# Patient Record
Sex: FEMALE | Race: WHITE | Employment: UNEMPLOYED | URBAN - METROPOLITAN AREA
[De-identification: names, ages, dates, MRNs, and addresses within clinical notes are randomized per-mention and may not be internally consistent; named-entity substitution may affect disease eponyms.]

---

## 2020-08-16 ENCOUNTER — HOSPITAL ENCOUNTER (INPATIENT)
Age: 65
LOS: 5 days | Discharge: HOME HEALTH | DRG: 286 | End: 2020-08-21
Attending: EMERGENCY MEDICINE | Admitting: INTERNAL MEDICINE
Payer: MEDICARE

## 2020-08-16 ENCOUNTER — APPOINTMENT (OUTPATIENT)
Dept: CT IMAGING | Age: 65
DRG: 286 | End: 2020-08-16
Attending: EMERGENCY MEDICINE
Payer: MEDICARE

## 2020-08-16 ENCOUNTER — APPOINTMENT (OUTPATIENT)
Dept: GENERAL RADIOLOGY | Age: 65
DRG: 286 | End: 2020-08-16
Attending: EMERGENCY MEDICINE
Payer: MEDICARE

## 2020-08-16 DIAGNOSIS — I50.9 ACUTE CONGESTIVE HEART FAILURE, UNSPECIFIED HEART FAILURE TYPE (HCC): Primary | ICD-10-CM

## 2020-08-16 DIAGNOSIS — R77.8 ELEVATED TROPONIN: ICD-10-CM

## 2020-08-16 LAB
ALBUMIN SERPL-MCNC: 4 G/DL (ref 3.4–5)
ALBUMIN/GLOB SERPL: 1.3 {RATIO} (ref 0.8–1.7)
ALP SERPL-CCNC: 80 U/L (ref 45–117)
ALT SERPL-CCNC: 96 U/L (ref 13–56)
ANION GAP SERPL CALC-SCNC: 11 MMOL/L (ref 3–18)
APPEARANCE UR: ABNORMAL
AST SERPL-CCNC: 68 U/L (ref 10–38)
ATRIAL RATE: 126 BPM
BACTERIA URNS QL MICRO: NEGATIVE /HPF
BASOPHILS # BLD: 0.1 K/UL (ref 0–0.1)
BASOPHILS NFR BLD: 1 % (ref 0–2)
BILIRUB SERPL-MCNC: 1.5 MG/DL (ref 0.2–1)
BILIRUB UR QL: NEGATIVE
BNP SERPL-MCNC: 8935 PG/ML (ref 0–900)
BUN SERPL-MCNC: 15 MG/DL (ref 7–18)
BUN/CREAT SERPL: 21 (ref 12–20)
CALCIUM SERPL-MCNC: 8.3 MG/DL (ref 8.5–10.1)
CALCULATED P AXIS, ECG09: -2 DEGREES
CALCULATED R AXIS, ECG10: -49 DEGREES
CALCULATED T AXIS, ECG11: 82 DEGREES
CHLORIDE SERPL-SCNC: 109 MMOL/L (ref 100–111)
CO2 SERPL-SCNC: 20 MMOL/L (ref 21–32)
COLOR UR: ABNORMAL
CREAT SERPL-MCNC: 0.7 MG/DL (ref 0.6–1.3)
D DIMER PPP FEU-MCNC: 0.59 UG/ML(FEU)
DIAGNOSIS, 93000: NORMAL
DIFFERENTIAL METHOD BLD: ABNORMAL
EOSINOPHIL # BLD: 0 K/UL (ref 0–0.4)
EOSINOPHIL NFR BLD: 0 % (ref 0–5)
EPITH CASTS URNS QL MICRO: NORMAL /LPF (ref 0–5)
ERYTHROCYTE [DISTWIDTH] IN BLOOD BY AUTOMATED COUNT: 16 % (ref 11.6–14.5)
GLOBULIN SER CALC-MCNC: 3 G/DL (ref 2–4)
GLUCOSE SERPL-MCNC: 136 MG/DL (ref 74–99)
GLUCOSE UR STRIP.AUTO-MCNC: NEGATIVE MG/DL
HCT VFR BLD AUTO: 46.5 % (ref 35–45)
HGB BLD-MCNC: 15.2 G/DL (ref 12–16)
HGB UR QL STRIP: NEGATIVE
KETONES UR QL STRIP.AUTO: 15 MG/DL
LEUKOCYTE ESTERASE UR QL STRIP.AUTO: ABNORMAL
LYMPHOCYTES # BLD: 1 K/UL (ref 0.9–3.6)
LYMPHOCYTES NFR BLD: 11 % (ref 21–52)
MCH RBC QN AUTO: 27.4 PG (ref 24–34)
MCHC RBC AUTO-ENTMCNC: 32.7 G/DL (ref 31–37)
MCV RBC AUTO: 83.9 FL (ref 74–97)
MONOCYTES # BLD: 0.7 K/UL (ref 0.05–1.2)
MONOCYTES NFR BLD: 8 % (ref 3–10)
NEUTS SEG # BLD: 7.1 K/UL (ref 1.8–8)
NEUTS SEG NFR BLD: 80 % (ref 40–73)
NITRITE UR QL STRIP.AUTO: NEGATIVE
P-R INTERVAL, ECG05: 130 MS
PH UR STRIP: 5 [PH] (ref 5–8)
PLATELET # BLD AUTO: 204 K/UL (ref 135–420)
PMV BLD AUTO: 10.3 FL (ref 9.2–11.8)
POTASSIUM SERPL-SCNC: 3.7 MMOL/L (ref 3.5–5.5)
PROT SERPL-MCNC: 7 G/DL (ref 6.4–8.2)
PROT UR STRIP-MCNC: 100 MG/DL
Q-T INTERVAL, ECG07: 356 MS
QRS DURATION, ECG06: 148 MS
QTC CALCULATION (BEZET), ECG08: 515 MS
RBC # BLD AUTO: 5.54 M/UL (ref 4.2–5.3)
RBC #/AREA URNS HPF: NEGATIVE /HPF (ref 0–5)
SODIUM SERPL-SCNC: 140 MMOL/L (ref 136–145)
SP GR UR REFRACTOMETRY: 1.02 (ref 1–1.03)
TROPONIN I SERPL-MCNC: 0.12 NG/ML (ref 0–0.04)
TROPONIN I SERPL-MCNC: 0.14 NG/ML (ref 0–0.04)
TSH SERPL DL<=0.05 MIU/L-ACNC: 2.5 UIU/ML (ref 0.36–3.74)
UROBILINOGEN UR QL STRIP.AUTO: 1 EU/DL (ref 0.2–1)
VENTRICULAR RATE, ECG03: 126 BPM
WBC # BLD AUTO: 8.8 K/UL (ref 4.6–13.2)
WBC URNS QL MICRO: NORMAL /HPF (ref 0–4)

## 2020-08-16 PROCEDURE — 74011636320 HC RX REV CODE- 636/320: Performed by: EMERGENCY MEDICINE

## 2020-08-16 PROCEDURE — 85025 COMPLETE CBC W/AUTO DIFF WBC: CPT

## 2020-08-16 PROCEDURE — 81001 URINALYSIS AUTO W/SCOPE: CPT

## 2020-08-16 PROCEDURE — 99285 EMERGENCY DEPT VISIT HI MDM: CPT

## 2020-08-16 PROCEDURE — 93005 ELECTROCARDIOGRAM TRACING: CPT

## 2020-08-16 PROCEDURE — 71046 X-RAY EXAM CHEST 2 VIEWS: CPT

## 2020-08-16 PROCEDURE — 74011250637 HC RX REV CODE- 250/637: Performed by: EMERGENCY MEDICINE

## 2020-08-16 PROCEDURE — 83880 ASSAY OF NATRIURETIC PEPTIDE: CPT

## 2020-08-16 PROCEDURE — 71275 CT ANGIOGRAPHY CHEST: CPT

## 2020-08-16 PROCEDURE — 80053 COMPREHEN METABOLIC PANEL: CPT

## 2020-08-16 PROCEDURE — 85379 FIBRIN DEGRADATION QUANT: CPT

## 2020-08-16 PROCEDURE — 84443 ASSAY THYROID STIM HORMONE: CPT

## 2020-08-16 PROCEDURE — 74011250636 HC RX REV CODE- 250/636: Performed by: EMERGENCY MEDICINE

## 2020-08-16 PROCEDURE — 87635 SARS-COV-2 COVID-19 AMP PRB: CPT

## 2020-08-16 PROCEDURE — 65660000000 HC RM CCU STEPDOWN

## 2020-08-16 PROCEDURE — 84484 ASSAY OF TROPONIN QUANT: CPT

## 2020-08-16 PROCEDURE — 96374 THER/PROPH/DIAG INJ IV PUSH: CPT

## 2020-08-16 RX ORDER — ASPIRIN 325 MG
325 TABLET ORAL
Status: COMPLETED | OUTPATIENT
Start: 2020-08-16 | End: 2020-08-16

## 2020-08-16 RX ORDER — ESTRADIOL 0.05 MG/D
FILM, EXTENDED RELEASE TRANSDERMAL
COMMUNITY
Start: 2020-07-13

## 2020-08-16 RX ORDER — PROGESTERONE 200 MG/1
300 CAPSULE ORAL DAILY
COMMUNITY
Start: 2020-07-07

## 2020-08-16 RX ORDER — LEVOTHYROXINE, LIOTHYRONINE 38; 9 UG/1; UG/1
TABLET ORAL
COMMUNITY
Start: 2020-07-13

## 2020-08-16 RX ORDER — BUPROPION HYDROCHLORIDE 150 MG/1
TABLET ORAL
COMMUNITY
Start: 2020-08-02 | End: 2021-10-19 | Stop reason: ALTCHOICE

## 2020-08-16 RX ORDER — TRAZODONE HYDROCHLORIDE 50 MG/1
TABLET ORAL
COMMUNITY
Start: 2020-06-29

## 2020-08-16 RX ORDER — FUROSEMIDE 10 MG/ML
40 INJECTION INTRAMUSCULAR; INTRAVENOUS
Status: COMPLETED | OUTPATIENT
Start: 2020-08-16 | End: 2020-08-16

## 2020-08-16 RX ADMIN — ASPIRIN 325 MG ORAL TABLET 325 MG: 325 PILL ORAL at 15:32

## 2020-08-16 RX ADMIN — FUROSEMIDE 40 MG: 10 INJECTION, SOLUTION INTRAMUSCULAR; INTRAVENOUS at 13:06

## 2020-08-16 RX ADMIN — IOPAMIDOL 80 ML: 755 INJECTION, SOLUTION INTRAVENOUS at 12:45

## 2020-08-16 NOTE — Clinical Note
TRANSFER - OUT REPORT:     Verbal report given to: Reji Armendariz RN. Report consisted of patient's Situation, Background, Assessment and   Recommendations(SBAR). Opportunity for questions and clarification was provided. Patient transported with a Cardiac Cath Tech / Patient Care Tech. Patient transported to: [facility].

## 2020-08-16 NOTE — Clinical Note
TRANSFER - IN REPORT:     Verbal report received from: Katarina Mabry RN. Report consisted of patient's Situation, Background, Assessment and   Recommendations(SBAR). Opportunity for questions and clarification was provided. Assessment completed upon patient's arrival to unit and care assumed. Patient transported with a Cardiac Cath Tech / Patient Care Tech.

## 2020-08-16 NOTE — Clinical Note
Right groin and right radial clipped prepped with ChloraPrep and draped. Wet prep elapsed drying time: 3 mins.

## 2020-08-16 NOTE — ED TRIAGE NOTES
Pt reports rapid heart rate, palpitations and fatigue x 3 days. Pt states increased today while walking her dog. Pt denies any chest pain.

## 2020-08-16 NOTE — ED PROVIDER NOTES
59-year-old female history of hypothyroidism, anxiety presents for evaluation of shortness of breath and rapid heart rate. Patient believes she passed a kidney stone 3 days ago. She had right flank pain with nausea. Transient difficulty voiding. Symptoms have all resolved at this point. Starting yesterday however, patient notes marked dyspnea. When she attempted to walk her dog she became very short of breath. This morning symptoms were worse with increasing dyspnea on exertion and subjective tachycardia. Denies chest pain. No known exposure to [employer]. Denies productive cough or fever. No known history of COPD, CHF, or PE/DVT. Patient is a non-smoker. Admits that she has not seen a regular doctor for years but does receive medications through an alternative and complementary medicine practice. No past medical history on file. No past surgical history on file. No family history on file.     Social History     Socioeconomic History    Marital status: SINGLE     Spouse name: Not on file    Number of children: Not on file    Years of education: Not on file    Highest education level: Not on file   Occupational History    Not on file   Social Needs    Financial resource strain: Not on file    Food insecurity     Worry: Not on file     Inability: Not on file    Transportation needs     Medical: Not on file     Non-medical: Not on file   Tobacco Use    Smoking status: Not on file   Substance and Sexual Activity    Alcohol use: Not on file    Drug use: Not on file    Sexual activity: Not on file   Lifestyle    Physical activity     Days per week: Not on file     Minutes per session: Not on file    Stress: Not on file   Relationships    Social connections     Talks on phone: Not on file     Gets together: Not on file     Attends Orthodox service: Not on file     Active member of club or organization: Not on file     Attends meetings of clubs or organizations: Not on file Relationship status: Not on file    Intimate partner violence     Fear of current or ex partner: Not on file     Emotionally abused: Not on file     Physically abused: Not on file     Forced sexual activity: Not on file   Other Topics Concern    Not on file   Social History Narrative    Not on file         ALLERGIES: Patient has no allergy information on record. Review of Systems   Constitutional: Negative for fever. Respiratory: Positive for cough and shortness of breath. Cardiovascular: Negative for chest pain and leg swelling. Gastrointestinal: Negative for abdominal pain. Genitourinary: Positive for flank pain. All other systems reviewed and are negative. Vitals:    08/16/20 1021   BP: (!) 145/96   Pulse: (!) 117   Resp: 24   Temp: 97.6 °F (36.4 °C)   SpO2: 93%   Weight: 108.9 kg (240 lb)   Height: 5' 2\" (1.575 m)            Physical Exam  Vitals signs and nursing note reviewed. Constitutional:       General: She is not in acute distress. Appearance: She is well-developed. She is not diaphoretic. HENT:      Head: Normocephalic and atraumatic. Nose: Nose normal.   Eyes:      General: No scleral icterus. Right eye: No discharge. Left eye: No discharge. Neck:      Musculoskeletal: Neck supple. Vascular: No JVD. Cardiovascular:      Rate and Rhythm: Regular rhythm. Tachycardia present. Heart sounds: Normal heart sounds. No murmur. No friction rub. No gallop. Pulmonary:      Effort: Pulmonary effort is normal.      Breath sounds: Normal breath sounds. No wheezing or rales. Comments: Tachypnea at rest.  Abdominal:      Palpations: Abdomen is soft. Tenderness: There is no abdominal tenderness. There is no guarding or rebound. Musculoskeletal:         General: No tenderness. Right lower leg: No edema. Left lower leg: No edema. Skin:     General: Skin is warm and dry. Findings: No rash.    Neurological:      Mental Status: She is alert and oriented to person, place, and time. Motor: No abnormal muscle tone. Coordination: Coordination normal.   Psychiatric:         Mood and Affect: Mood normal.     EKG interpreted per myself at 1012 hrs. Sinus tachycardia, rate 126. Left bundle branch block pattern with left axis deviation pattern. No old EKGs for comparison. Recent Results (from the past 12 hour(s))   EKG, 12 LEAD, INITIAL    Collection Time: 08/16/20 10:11 AM   Result Value Ref Range    Ventricular Rate 126 BPM    Atrial Rate 126 BPM    P-R Interval 130 ms    QRS Duration 148 ms    Q-T Interval 356 ms    QTC Calculation (Bezet) 515 ms    Calculated P Axis -2 degrees    Calculated R Axis -49 degrees    Calculated T Axis 82 degrees    Diagnosis       Sinus tachycardia with premature atrial complexes  Left axis deviation  Left bundle branch block  Abnormal ECG  No previous ECGs available     CBC WITH AUTOMATED DIFF    Collection Time: 08/16/20 10:35 AM   Result Value Ref Range    WBC 8.8 4.6 - 13.2 K/uL    RBC 5.54 (H) 4.20 - 5.30 M/uL    HGB 15.2 12.0 - 16.0 g/dL    HCT 46.5 (H) 35.0 - 45.0 %    MCV 83.9 74.0 - 97.0 FL    MCH 27.4 24.0 - 34.0 PG    MCHC 32.7 31.0 - 37.0 g/dL    RDW 16.0 (H) 11.6 - 14.5 %    PLATELET 661 878 - 162 K/uL    MPV 10.3 9.2 - 11.8 FL    NEUTROPHILS 80 (H) 40 - 73 %    LYMPHOCYTES 11 (L) 21 - 52 %    MONOCYTES 8 3 - 10 %    EOSINOPHILS 0 0 - 5 %    BASOPHILS 1 0 - 2 %    ABS. NEUTROPHILS 7.1 1.8 - 8.0 K/UL    ABS. LYMPHOCYTES 1.0 0.9 - 3.6 K/UL    ABS. MONOCYTES 0.7 0.05 - 1.2 K/UL    ABS. EOSINOPHILS 0.0 0.0 - 0.4 K/UL    ABS.  BASOPHILS 0.1 0.0 - 0.1 K/UL    DF AUTOMATED     METABOLIC PANEL, COMPREHENSIVE    Collection Time: 08/16/20 10:35 AM   Result Value Ref Range    Sodium 140 136 - 145 mmol/L    Potassium 3.7 3.5 - 5.5 mmol/L    Chloride 109 100 - 111 mmol/L    CO2 20 (L) 21 - 32 mmol/L    Anion gap 11 3.0 - 18 mmol/L    Glucose 136 (H) 74 - 99 mg/dL    BUN 15 7.0 - 18 MG/DL    Creatinine 0. 70 0.6 - 1.3 MG/DL    BUN/Creatinine ratio 21 (H) 12 - 20      GFR est AA >60 >60 ml/min/1.73m2    GFR est non-AA >60 >60 ml/min/1.73m2    Calcium 8.3 (L) 8.5 - 10.1 MG/DL    Bilirubin, total 1.5 (H) 0.2 - 1.0 MG/DL    ALT (SGPT) 96 (H) 13 - 56 U/L    AST (SGOT) 68 (H) 10 - 38 U/L    Alk. phosphatase 80 45 - 117 U/L    Protein, total 7.0 6.4 - 8.2 g/dL    Albumin 4.0 3.4 - 5.0 g/dL    Globulin 3.0 2.0 - 4.0 g/dL    A-G Ratio 1.3 0.8 - 1.7     NT-PRO BNP    Collection Time: 08/16/20 10:35 AM   Result Value Ref Range    NT pro-BNP 8,935 (H) 0 - 900 PG/ML   D DIMER    Collection Time: 08/16/20 10:35 AM   Result Value Ref Range    D DIMER 0.59 (H) <0.46 ug/ml(FEU)   TROPONIN I    Collection Time: 08/16/20 10:35 AM   Result Value Ref Range    Troponin-I, QT 0.12 (H) 0.0 - 0.045 NG/ML   URINALYSIS W/ RFLX MICROSCOPIC    Collection Time: 08/16/20 10:50 AM   Result Value Ref Range    Color DARK YELLOW      Appearance CLOUDY      Specific gravity 1.024 1.005 - 1.030      pH (UA) 5.0 5.0 - 8.0      Protein 100 (A) NEG mg/dL    Glucose Negative NEG mg/dL    Ketone 15 (A) NEG mg/dL    Bilirubin Negative NEG      Blood Negative NEG      Urobilinogen 1.0 0.2 - 1.0 EU/dL    Nitrites Negative NEG      Leukocyte Esterase TRACE (A) NEG     URINE MICROSCOPIC ONLY    Collection Time: 08/16/20 10:50 AM   Result Value Ref Range    WBC 0 to 3 0 - 4 /hpf    RBC Negative 0 - 5 /hpf    Epithelial cells 1+ 0 - 5 /lpf    Bacteria Negative NEG /hpf     CTA CHEST W OR W WO CONT   Final Result   IMPRESSION:      1. No evidence for pulmonary embolism. .   2.  Mild diffuse interstitial prominence, likely edema. 3.  Patchy subtle groundglass opacities throughout the lungs bilaterally which   are nonspecific but could be due to edema or infectious or inflammatory process. Findings could be seen in covid pneumonia although are not typical appearance. Constellation of findings favors CHF. 4.  Small right and trace left pleural effusion.    5. Large hiatal hernia         XR CHEST PA LAT   Final Result   IMPRESSION:     1. Mild cardiomegaly. Central pulmonary vascular congestion. Diffuse   interstitial prominence suggesting edema. 2.  Large hiatal hernia                    MDM  Number of Diagnoses or Management Options  Acute congestive heart failure, unspecified heart failure type [facility]):   Diagnosis management comments: Impression: Dyspnea and tachycardia. Worrisome presentation for PE. Also consider pneumonia, new onset CHF, COVID-19 pulmonary infection, atypical ACS. Less likely sepsis given absence of fever. No history of cough. Evaluation to include labs including troponin and d-dimer, EKG, chest x-ray and consideration for CTA of the chest based on results of d-dimer test.      Results of BNP and chest x-ray consistent with acute congestive heart failure. Patient also demonstrates elevated troponin which will need to be serially monitored. EKG with left bundle branch block uncertain duration. No chest pain clinically. Patient diuresed well with 60 mg of IV Lasix producing roughly 1 L of urine. Felt him proved with this intervention. Heart rate declined to approximately 100 sinus tachycardia. Case discussed with Dr. Davies, hospitalist, for admission to telemetry. Usual and customary discussion of patient's history and ED course. Care discussed with cardiology, Dr. Maverick Iqbal. No indication for initiation of heparin, ACE inhibitors, or beta blockers at this time. Procedures      Diagnosis:   1.  Acute congestive heart failure, unspecified heart failure type ([facility] [address]          Disposition: admit    Follow-up Information    None         Patient's Medications   Start Taking    No medications on file   Continue Taking    BUPROPION XL (WELLBUTRIN XL) 150 MG TABLET        ESTRADIOL (VIVELLE) 0.05 MG/24 HR    APPLY 1 PATCH ON THE SKIN TWICE A WEEK    NP THYROID 60 MG TABLET    TAKE 1 TABLET BY MOUTH ONCE DAILY IN THE MORNING PROGESTERONE (PROMETRIUM) 200 MG CAPSULE    TAKE 1 CAPSULE BY MOUTH AT BEDTIME    TRAZODONE (DESYREL) 50 MG TABLET    TAKE 1 TABLET BY MOUTH ONCE DAILY AT BEDTIME   These Medications have changed    No medications on file   Stop Taking    No medications on file

## 2020-08-17 ENCOUNTER — APPOINTMENT (OUTPATIENT)
Dept: GENERAL RADIOLOGY | Age: 65
DRG: 286 | End: 2020-08-17
Attending: EMERGENCY MEDICINE
Payer: MEDICARE

## 2020-08-17 LAB
ATRIAL RATE: 90 BPM
CALCULATED P AXIS, ECG09: 51 DEGREES
CALCULATED R AXIS, ECG10: -62 DEGREES
CALCULATED T AXIS, ECG11: 65 DEGREES
DIAGNOSIS, 93000: NORMAL
P-R INTERVAL, ECG05: 152 MS
Q-T INTERVAL, ECG07: 420 MS
QRS DURATION, ECG06: 156 MS
QTC CALCULATION (BEZET), ECG08: 513 MS
TROPONIN I SERPL-MCNC: 0.12 NG/ML (ref 0–0.04)
TROPONIN I SERPL-MCNC: 0.17 NG/ML (ref 0–0.04)
VENTRICULAR RATE, ECG03: 90 BPM

## 2020-08-17 PROCEDURE — 74011000250 HC RX REV CODE- 250: Performed by: EMERGENCY MEDICINE

## 2020-08-17 PROCEDURE — 65660000000 HC RM CCU STEPDOWN

## 2020-08-17 PROCEDURE — 93005 ELECTROCARDIOGRAM TRACING: CPT

## 2020-08-17 PROCEDURE — 71045 X-RAY EXAM CHEST 1 VIEW: CPT

## 2020-08-17 PROCEDURE — 84484 ASSAY OF TROPONIN QUANT: CPT

## 2020-08-17 PROCEDURE — 74011250636 HC RX REV CODE- 250/636: Performed by: EMERGENCY MEDICINE

## 2020-08-17 RX ADMIN — AZITHROMYCIN MONOHYDRATE 500 MG: 500 INJECTION, POWDER, LYOPHILIZED, FOR SOLUTION INTRAVENOUS at 06:32

## 2020-08-17 RX ADMIN — CEFTRIAXONE SODIUM 1 G: 1 INJECTION, POWDER, FOR SOLUTION INTRAMUSCULAR; INTRAVENOUS at 06:32

## 2020-08-17 NOTE — ED NOTES
Awake in bed, watching TV. Denies any discomforts.
Awake talking on phone. Denies any discomforts. Purposeful rounding completed:  Side rails up x 2:  YES  Bed in low position and wheels locked: YES  Call bell within reach: YES  Comfort addressed: YES    Toileting needs addressed: YES  Plan of care reviewed/updated with patient and or family members: YES  IV site assessed: YES  Pain assessed and addressed: YES  Will continue to monitor.
Bedside Hand-off-Report given to oncoming shift RN Javier Salinas), including  all care previously given. Addressed all questions asked by oncoming RN.
Bedside and Verbal shift change report given to Patricia Landeros RN (oncoming nurse) by Fred Sweeney RN (offgoing nurse). Report included the following information SBAR and MAR.
Breakfast tray to bedside
Dinner tray to bedside.
Lunch tray to bedside
Patient denies pain at this time. Has been up to [facility] without assistance x2-3 times. States she is feeling much better. Updated on status, position of comfort.
Patient resting quietly on stretcher in low and locked position. Call bell in reach. Patient states she continues to improve. Denies chest pain or shortness of breath at this time. Dinner at bedside.
Pt. Reassessed at this time and has no complaints, pt asked about bed status and was updated that no beds were avalible at this time. Pt also asking for diet pepsi and was given some, pt eating her dinner without complications and tolerating well. Pt sitting in bed in the POC with NAD at this time. MD notified and will continue to monitor.
Purposeful rounding completed:  Side rails up x 2:  YES  Bed in low position and wheels locked: YES  Call bell within reach: YES  Comfort addressed: YES    Toileting needs addressed: YES  Plan of care reviewed/updated with patient and or family members: YES  IV site assessed: YES  Pain assessed and addressed: YES  Will continue to monitor.
Received report from off-going-shift RN (Winnie),and assumed care of patient. Call bell with safia.
Report given to SARWAT Mason RN to include ED Summary.
Resting quietly. No distress noted. Respirations equal and unlabored. Skins warm, dry, acyanotic. Arouses easily to verbal stimuli. Body indicators negative for pain or discomfort. Arouses with no complaints. Will continue to monitor.
Up to bathroom via wheelchair and staff assist. Dyspnea upon exertion. Back to stretcher.  Sa02 96%, 's
No

## 2020-08-18 ENCOUNTER — APPOINTMENT (OUTPATIENT)
Dept: NON INVASIVE DIAGNOSTICS | Age: 65
DRG: 286 | End: 2020-08-18
Attending: INTERNAL MEDICINE
Payer: MEDICARE

## 2020-08-18 PROBLEM — R77.8 ELEVATED TROPONIN: Status: ACTIVE | Noted: 2020-08-18

## 2020-08-18 PROBLEM — I42.9 CARDIOMYOPATHY (HCC): Status: ACTIVE | Noted: 2020-08-18

## 2020-08-18 PROBLEM — J18.9 CAP (COMMUNITY ACQUIRED PNEUMONIA): Status: ACTIVE | Noted: 2020-08-18

## 2020-08-18 PROBLEM — Z20.822 SUSPECTED COVID-19 VIRUS INFECTION: Status: ACTIVE | Noted: 2020-08-18

## 2020-08-18 LAB
ALBUMIN SERPL-MCNC: 3.4 G/DL (ref 3.4–5)
ALBUMIN/GLOB SERPL: 1.1 {RATIO} (ref 0.8–1.7)
ALP SERPL-CCNC: 63 U/L (ref 45–117)
ALT SERPL-CCNC: 78 U/L (ref 13–56)
ANION GAP SERPL CALC-SCNC: 7 MMOL/L (ref 3–18)
AST SERPL-CCNC: 40 U/L (ref 10–38)
ATRIAL RATE: 104 BPM
BASOPHILS # BLD: 0.1 K/UL (ref 0–0.1)
BASOPHILS NFR BLD: 1 % (ref 0–2)
BILIRUB SERPL-MCNC: 1 MG/DL (ref 0.2–1)
BNP SERPL-MCNC: 6755 PG/ML (ref 0–900)
BUN SERPL-MCNC: 15 MG/DL (ref 7–18)
BUN/CREAT SERPL: 24 (ref 12–20)
CALCIUM SERPL-MCNC: 8.5 MG/DL (ref 8.5–10.1)
CALCULATED P AXIS, ECG09: 37 DEGREES
CALCULATED R AXIS, ECG10: -24 DEGREES
CALCULATED T AXIS, ECG11: 41 DEGREES
CHLORIDE SERPL-SCNC: 111 MMOL/L (ref 100–111)
CHOLEST SERPL-MCNC: 142 MG/DL
CK MB CFR SERPL CALC: 3.1 % (ref 0–4)
CK MB SERPL-MCNC: 2.3 NG/ML (ref 5–25)
CK SERPL-CCNC: 75 U/L (ref 26–192)
CO2 SERPL-SCNC: 25 MMOL/L (ref 21–32)
CREAT SERPL-MCNC: 0.63 MG/DL (ref 0.6–1.3)
CRP SERPL-MCNC: 1 MG/DL (ref 0–0.3)
D DIMER PPP FEU-MCNC: 0.44 UG/ML(FEU)
DIAGNOSIS, 93000: NORMAL
DIFFERENTIAL METHOD BLD: ABNORMAL
ECHO LV INTERNAL DIMENSION DIASTOLIC: 6.19 CM (ref 3.9–5.3)
ECHO LV INTERNAL DIMENSION SYSTOLIC: 5.1 CM
ECHO LV IVSD: 0.51 CM (ref 0.6–0.9)
ECHO LV MASS 2D: 205.7 G (ref 67–162)
ECHO LV MASS INDEX 2D: 99.6 G/M2 (ref 43–95)
ECHO LV POSTERIOR WALL DIASTOLIC: 1.15 CM (ref 0.6–0.9)
ECHO RV TAPSE: 3.1 CM (ref 1.5–2)
ECHO TV REGURGITANT MAX VELOCITY: 287.51 CM/S
ECHO TV REGURGITANT PEAK GRADIENT: 33.07 MMHG
EOSINOPHIL # BLD: 0.1 K/UL (ref 0–0.4)
EOSINOPHIL NFR BLD: 2 % (ref 0–5)
ERYTHROCYTE [DISTWIDTH] IN BLOOD BY AUTOMATED COUNT: 16 % (ref 11.6–14.5)
EST. AVERAGE GLUCOSE BLD GHB EST-MCNC: 103 MG/DL
FERRITIN SERPL-MCNC: 26 NG/ML (ref 8–388)
GLOBULIN SER CALC-MCNC: 3.1 G/DL (ref 2–4)
GLUCOSE SERPL-MCNC: 105 MG/DL (ref 74–99)
HBA1C MFR BLD: 5.2 % (ref 4.2–5.6)
HCT VFR BLD AUTO: 41.4 % (ref 35–45)
HDLC SERPL-MCNC: 52 MG/DL (ref 40–60)
HDLC SERPL: 2.7 {RATIO} (ref 0–5)
HGB BLD-MCNC: 13.6 G/DL (ref 12–16)
L PNEUMO AG UR QL IA: NEGATIVE
LACTATE SERPL-SCNC: 0.9 MMOL/L (ref 0.4–2)
LDH SERPL L TO P-CCNC: 187 U/L (ref 81–234)
LDLC SERPL CALC-MCNC: 75 MG/DL (ref 0–100)
LIPID PROFILE,FLP: NORMAL
LYMPHOCYTES # BLD: 1.2 K/UL (ref 0.9–3.6)
LYMPHOCYTES NFR BLD: 23 % (ref 21–52)
MAGNESIUM SERPL-MCNC: 2 MG/DL (ref 1.6–2.6)
MCH RBC QN AUTO: 27.8 PG (ref 24–34)
MCHC RBC AUTO-ENTMCNC: 32.9 G/DL (ref 31–37)
MCV RBC AUTO: 84.5 FL (ref 74–97)
MONOCYTES # BLD: 0.5 K/UL (ref 0.05–1.2)
MONOCYTES NFR BLD: 10 % (ref 3–10)
NEUTS SEG # BLD: 3.3 K/UL (ref 1.8–8)
NEUTS SEG NFR BLD: 64 % (ref 40–73)
P-R INTERVAL, ECG05: 136 MS
PLATELET # BLD AUTO: 177 K/UL (ref 135–420)
PMV BLD AUTO: 10.6 FL (ref 9.2–11.8)
POTASSIUM SERPL-SCNC: 3.6 MMOL/L (ref 3.5–5.5)
PROCALCITONIN SERPL-MCNC: <0.05 NG/ML
PROT SERPL-MCNC: 6.5 G/DL (ref 6.4–8.2)
Q-T INTERVAL, ECG07: 398 MS
QRS DURATION, ECG06: 154 MS
QTC CALCULATION (BEZET), ECG08: 523 MS
RBC # BLD AUTO: 4.9 M/UL (ref 4.2–5.3)
S PNEUM AG UR QL: NEGATIVE
SARS-COV-2, COV2NT: NOT DETECTED
SODIUM SERPL-SCNC: 143 MMOL/L (ref 136–145)
TRIGL SERPL-MCNC: 75 MG/DL (ref ?–150)
TROPONIN I SERPL-MCNC: 0.05 NG/ML (ref 0–0.04)
VENTRICULAR RATE, ECG03: 104 BPM
VLDLC SERPL CALC-MCNC: 15 MG/DL
WBC # BLD AUTO: 5.2 K/UL (ref 4.6–13.2)

## 2020-08-18 PROCEDURE — 74011000250 HC RX REV CODE- 250: Performed by: INTERNAL MEDICINE

## 2020-08-18 PROCEDURE — 97530 THERAPEUTIC ACTIVITIES: CPT

## 2020-08-18 PROCEDURE — 85025 COMPLETE CBC W/AUTO DIFF WBC: CPT

## 2020-08-18 PROCEDURE — 83880 ASSAY OF NATRIURETIC PEPTIDE: CPT

## 2020-08-18 PROCEDURE — 97162 PT EVAL MOD COMPLEX 30 MIN: CPT

## 2020-08-18 PROCEDURE — 85379 FIBRIN DEGRADATION QUANT: CPT

## 2020-08-18 PROCEDURE — 97535 SELF CARE MNGMENT TRAINING: CPT

## 2020-08-18 PROCEDURE — 80061 LIPID PANEL: CPT

## 2020-08-18 PROCEDURE — 97166 OT EVAL MOD COMPLEX 45 MIN: CPT

## 2020-08-18 PROCEDURE — 84145 PROCALCITONIN (PCT): CPT

## 2020-08-18 PROCEDURE — 93308 TTE F-UP OR LMTD: CPT

## 2020-08-18 PROCEDURE — 97116 GAIT TRAINING THERAPY: CPT

## 2020-08-18 PROCEDURE — 82550 ASSAY OF CK (CPK): CPT

## 2020-08-18 PROCEDURE — 87450 LEGIONELLA PNEUMOPHILA AG, URINE: CPT

## 2020-08-18 PROCEDURE — 74011250637 HC RX REV CODE- 250/637: Performed by: INTERNAL MEDICINE

## 2020-08-18 PROCEDURE — 87040 BLOOD CULTURE FOR BACTERIA: CPT

## 2020-08-18 PROCEDURE — 83615 LACTATE (LD) (LDH) ENZYME: CPT

## 2020-08-18 PROCEDURE — 74011250636 HC RX REV CODE- 250/636: Performed by: INTERNAL MEDICINE

## 2020-08-18 PROCEDURE — 87449 NOS EACH ORGANISM AG IA: CPT

## 2020-08-18 PROCEDURE — 86140 C-REACTIVE PROTEIN: CPT

## 2020-08-18 PROCEDURE — 80053 COMPREHEN METABOLIC PANEL: CPT

## 2020-08-18 PROCEDURE — 83605 ASSAY OF LACTIC ACID: CPT

## 2020-08-18 PROCEDURE — 36415 COLL VENOUS BLD VENIPUNCTURE: CPT

## 2020-08-18 PROCEDURE — 82728 ASSAY OF FERRITIN: CPT

## 2020-08-18 PROCEDURE — 93005 ELECTROCARDIOGRAM TRACING: CPT

## 2020-08-18 PROCEDURE — 83036 HEMOGLOBIN GLYCOSYLATED A1C: CPT

## 2020-08-18 PROCEDURE — 65660000000 HC RM CCU STEPDOWN

## 2020-08-18 PROCEDURE — 83735 ASSAY OF MAGNESIUM: CPT

## 2020-08-18 RX ORDER — LISINOPRIL 5 MG/1
2.5 TABLET ORAL DAILY
Status: DISCONTINUED | OUTPATIENT
Start: 2020-08-18 | End: 2020-08-22 | Stop reason: HOSPADM

## 2020-08-18 RX ORDER — ACETAMINOPHEN 650 MG/1
650 SUPPOSITORY RECTAL
Status: DISCONTINUED | OUTPATIENT
Start: 2020-08-18 | End: 2020-08-22 | Stop reason: HOSPADM

## 2020-08-18 RX ORDER — FUROSEMIDE 10 MG/ML
40 INJECTION INTRAMUSCULAR; INTRAVENOUS 2 TIMES DAILY
Status: DISCONTINUED | OUTPATIENT
Start: 2020-08-18 | End: 2020-08-18

## 2020-08-18 RX ORDER — FUROSEMIDE 10 MG/ML
40 INJECTION INTRAMUSCULAR; INTRAVENOUS DAILY
Status: DISCONTINUED | OUTPATIENT
Start: 2020-08-19 | End: 2020-08-19

## 2020-08-18 RX ORDER — ENOXAPARIN SODIUM 100 MG/ML
40 INJECTION SUBCUTANEOUS DAILY
Status: DISCONTINUED | OUTPATIENT
Start: 2020-08-18 | End: 2020-08-18

## 2020-08-18 RX ORDER — SODIUM CHLORIDE 0.9 % (FLUSH) 0.9 %
5-40 SYRINGE (ML) INJECTION EVERY 8 HOURS
Status: DISCONTINUED | OUTPATIENT
Start: 2020-08-18 | End: 2020-08-22 | Stop reason: HOSPADM

## 2020-08-18 RX ORDER — POLYETHYLENE GLYCOL 3350 17 G/17G
17 POWDER, FOR SOLUTION ORAL DAILY PRN
Status: DISCONTINUED | OUTPATIENT
Start: 2020-08-18 | End: 2020-08-22 | Stop reason: HOSPADM

## 2020-08-18 RX ORDER — CARVEDILOL 6.25 MG/1
3.12 TABLET ORAL 2 TIMES DAILY WITH MEALS
Status: DISCONTINUED | OUTPATIENT
Start: 2020-08-18 | End: 2020-08-22 | Stop reason: HOSPADM

## 2020-08-18 RX ORDER — PROMETHAZINE HYDROCHLORIDE 12.5 MG/1
12.5 TABLET ORAL
Status: DISCONTINUED | OUTPATIENT
Start: 2020-08-18 | End: 2020-08-22 | Stop reason: HOSPADM

## 2020-08-18 RX ORDER — FAMOTIDINE 20 MG/1
20 TABLET, FILM COATED ORAL 2 TIMES DAILY
Status: DISCONTINUED | OUTPATIENT
Start: 2020-08-18 | End: 2020-08-22 | Stop reason: HOSPADM

## 2020-08-18 RX ORDER — SODIUM CHLORIDE 0.9 % (FLUSH) 0.9 %
5-40 SYRINGE (ML) INJECTION AS NEEDED
Status: DISCONTINUED | OUTPATIENT
Start: 2020-08-18 | End: 2020-08-22 | Stop reason: HOSPADM

## 2020-08-18 RX ORDER — ASPIRIN 81 MG/1
81 TABLET ORAL DAILY
Status: DISCONTINUED | OUTPATIENT
Start: 2020-08-18 | End: 2020-08-22 | Stop reason: HOSPADM

## 2020-08-18 RX ORDER — ACETAMINOPHEN 325 MG/1
650 TABLET ORAL
Status: DISCONTINUED | OUTPATIENT
Start: 2020-08-18 | End: 2020-08-22 | Stop reason: HOSPADM

## 2020-08-18 RX ORDER — ONDANSETRON 2 MG/ML
4 INJECTION INTRAMUSCULAR; INTRAVENOUS
Status: DISCONTINUED | OUTPATIENT
Start: 2020-08-18 | End: 2020-08-22 | Stop reason: HOSPADM

## 2020-08-18 RX ORDER — ENOXAPARIN SODIUM 100 MG/ML
40 INJECTION SUBCUTANEOUS EVERY 24 HOURS
Status: DISCONTINUED | OUTPATIENT
Start: 2020-08-18 | End: 2020-08-22 | Stop reason: HOSPADM

## 2020-08-18 RX ADMIN — LISINOPRIL 2.5 MG: 5 TABLET ORAL at 17:47

## 2020-08-18 RX ADMIN — ENOXAPARIN SODIUM 40 MG: 40 INJECTION SUBCUTANEOUS at 12:41

## 2020-08-18 RX ADMIN — CARVEDILOL 3.12 MG: 6.25 TABLET, FILM COATED ORAL at 17:47

## 2020-08-18 RX ADMIN — ASPIRIN 81 MG: 81 TABLET, COATED ORAL at 12:42

## 2020-08-18 RX ADMIN — FAMOTIDINE 20 MG: 20 TABLET, FILM COATED ORAL at 12:42

## 2020-08-18 RX ADMIN — FUROSEMIDE 40 MG: 10 INJECTION, SOLUTION INTRAMUSCULAR; INTRAVENOUS at 12:42

## 2020-08-18 RX ADMIN — FAMOTIDINE 20 MG: 20 TABLET, FILM COATED ORAL at 17:47

## 2020-08-18 RX ADMIN — AZITHROMYCIN MONOHYDRATE 500 MG: 500 INJECTION, POWDER, LYOPHILIZED, FOR SOLUTION INTRAVENOUS at 12:42

## 2020-08-18 RX ADMIN — Medication 10 ML: at 22:25

## 2020-08-18 RX ADMIN — Medication 10 ML: at 09:00

## 2020-08-18 RX ADMIN — CEFTRIAXONE SODIUM 1 G: 1 INJECTION, POWDER, FOR SOLUTION INTRAMUSCULAR; INTRAVENOUS at 12:42

## 2020-08-18 RX ADMIN — Medication 10 ML: at 14:00

## 2020-08-18 NOTE — PROGRESS NOTES
Called pt's room and cell phone with no answer. Left a message on pt's cell phone asking for return call to complete initial assessment.        Shelbi  RN BSN  Care Manager  [phone]

## 2020-08-18 NOTE — PROGRESS NOTES
Problem: Self Care Deficits Care Plan (Adult)  Goal: *Acute Goals and Plan of Care (Insert Text)  Outcome: Resolved/Met       OCCUPATIONAL THERAPY EVALUATION/DISCHARGE    Patient: Nupur Alexander (77 y.o. female)  Date: 8/18/2020  Primary Diagnosis: [facility] [I50.9]  Precautions: (standard)  PLOF: Patient was independent with self-care and functional mobility PTA. ASSESSMENT AND RECOMMENDATIONS:  Patient cleared to participate in OT evaluation by RN. Upon entering the room, patient was seated EOB, alert, and agreeable to participate in OT evaluation with RN present but exiting room. Patient educated on the role of OT, evaluation process, and safety during this admission with patient verbalizing understanding. MD briefly present this session during functional mobility using no AD. The patient presents with good static standing and fair dynamic standing balance, however will defer to PT for functional balance and functional mobility tasks. Patient is independent - modified independent with basic self-care, modified independent with bed mobility and contact guard assistance with functional transfers. Patient educated on energy conservation techniques, pursed lip breathing, and self pacing during daily activities. Patient needed cues throughout session for pursed lip breathing as she was observed SOB but continuing to talk. HR ranged between  bpm with functional activity and 90-94 while seated EOB initially. Based on the objective data described below, the patient presents with no deficits that impede pt function with ADLs, functional transfers, and functional mobility. OT to d/c from caseload at this time. Skilled occupational therapy is not indicated at this time. Discharge Recommendations: Home Health with Supervision;  If pt with no family members able to provide supervision recommend Rehab for safety to increase endurance / balance for functional activities  Further Equipment Recommendations for Discharge: shower chair to decrease the risk of falls and conserve energy; Patient reported having grab bars. SUBJECTIVE:   Patient stated I think I over did it trying to clean out my [place] house    OBJECTIVE DATA SUMMARY:     Past Medical History:   Diagnosis Date    Hypothyroid     Obesity      Past Surgical History:   Procedure Laterality Date    HX OTHER SURGICAL  2016    neck lift      Barriers to Learning/Limitations: None  Compensate with: visual, verbal, tactile, kinesthetic cues/model    Home Situation:   Home Situation  Home Environment: Private residence  # Steps to Enter: 3(second home has 2 steps)  Rails to [employer]: Yes  One/Two Story Residence: One story  Living Alone: Yes  Support Systems: Friends \ neighbors  Patient Expects to be Discharged to[de-identified] Private residence  Current DME Used/Available at Home: Grab bars  Tub or Shower Type: Tub/Shower combination  [x]     Right hand dominant   []     Left hand dominant    Cognitive/Behavioral Status:  Neurologic State: Alert  Orientation Level: Oriented X4  Cognition: Follows commands  Safety/Judgement: Fall prevention;Decreased awareness of need for safety    Skin: Intact  Edema: None noted    Vision/Perceptual:    Acuity: Within Defined Limits      Coordination: BUE  Fine Motor Skills-Upper: Left Intact; Right Intact    Gross Motor Skills-Upper: Left Intact; Right Intact    Balance:  Sitting: Intact  Standing: Impaired; Without support  Standing - Static: Good  Standing - Dynamic : Fair(+)    Strength: BUE  Strength: Generally decreased, functional    Tone & Sensation: BUE  Tone: Normal  Sensation: Intact    Range of Motion: BUE  AROM: Within functional limits    Functional Mobility and Transfers for ADLs:  Bed Mobility:  Scooting: Modified independent    Transfers:  Sit to Stand: Contact guard assistance  Stand to Sit: Contact guard assistance   Toilet Transfer : Contact guard assistance(simulation)    ADL Assessment:  Feeding: Independent    Oral Facial Hygiene/Grooming: Independent    Bathing: Modified independent; Additional time    Upper Body Dressing: Independent    Lower Body Dressing: Modified independent; Additional time(additional time for LLE)    Toileting: Modified independent    ADL Intervention:  Grooming  Grooming Assistance: Independent  Position Performed: Seated edge of bed  Brushing/Combing Hair: Independent(simulation)    Upper Body Dressing Assistance  Dressing Assistance: Independent  Shirt simulation with hospital gown: Independent    Lower Body Dressing Assistance  Dressing Assistance: Modified independent  Socks: Modified independent(additional time needed for LLE 2/2 PMHx contusion)  Leg Crossed Method Used: Yes  Position Performed: Seated edge of bed    Cognitive Retraining  Safety/Judgement: Fall prevention;Decreased awareness of need for safety    Pain:  Pain level pre-treatment: 0/10   Pain level post-treatment: 0/10   Pain Intervention(s): Medication (see MAR); Response to intervention: Nurse notified, See doc flow    Activity Tolerance:   Good    Please refer to the flowsheet for vital signs taken during this treatment. After treatment:   []  Patient left in no apparent distress sitting up in chair  [x]  Patient left in no apparent distress in bed  [x]  Call bell left within reach  [x]  Nursing notified  []  Caregiver present  []  Bed alarm activated    COMMUNICATION/EDUCATION:   [x]      Role of Occupational Therapy in the acute care setting  [x]      Home safety education was provided and the patient/caregiver indicated understanding. [x]      Patient/family have participated as able and agree with findings and recommendations. []      Patient is unable to participate in plan of care at this time.     Thank you for this referral.  Brandy Mcclellan OTR/L  Time Calculation: 38 mins      Eval Complexity: History: MEDIUM Complexity : Expanded review of history including physical, cognitive and psychosocial  history ; Examination: LOW Complexity : 1-3 performance deficits relating to physical, cognitive , or psychosocial skils that result in activity limitations and / or participation restrictions ;    Decision Making:LOW Complexity : No comorbidities that affect functional and no verbal or physical assistance needed to complete eval tasks

## 2020-08-18 NOTE — ROUTINE PROCESS
Bedside and verbal report received from [address] (offgoing nurse). Report included the following information; SBAR, MAR, LABS, Intake/output, Kardex, and summary of care.

## 2020-08-18 NOTE — CONSULTS
[facility] - Consult Note    Date of  Admission: 8/16/2020 10:05 AM     Primary Care Physician:  None     Plan:     1. Acute congestive heart failure- decompensated. SOB improving with diuresis elevated NT pro BNP  No significant pheripheral edema-reduced lasix 40 mg to once a day as patient w/o significant BLE edema    2. Cardiomyopathy with EF <15 % - unclear etiology. Needs Cardiac w/u after diuresis and when COV-19 is r/o. Discussed with patient    3. Hypertension- labile continue with Coreg and ACEi. Would consider Entresto after diuresis. 4. Suspected COV-19   5. SOB- in the setting #1 and #6  Better   6. Morbid obesity- weight los strongly advised   7. LBBB- unknown onset  8. MR- mild to moderate on Recent Echo     Patient presenting with acute CHF and found to have severely depressed ejection fraction. Significant improvement in symptoms of abdominal tightness and shortness of breath with IV Lasix. Since there is not much fluid overload clinically, reduce IV Lasix to once a day. Add low-dose Coreg and ACE inhibitor. If tolerated, will add Aldactone tomorrow. Entresto once diuresis is complete and stable. COVID test is still pending and I wonder if it is secondary to COVID-19 myocarditis or other viral myocarditis. 08/16/20   ECHO ADULT FOLLOW-UP OR LIMITED 08/18/2020 8/18/2020    Narrative · LV: Estimated LVEF is <15%. Visually measured ejection fraction. Normal   wall thickness. Moderately dilated left ventricle. Severely reduced   systolic function. · TV: Mild tricuspid valve regurgitation is present. · MV: Mild to moderate mitral valve regurgitation is present. · AV: Mild aortic valve regurgitation is present. · PA: Mild pulmonary hypertension. Pulmonary arterial systolic pressure is   41 mmHg. · COVID r/o  · RV: Mildly dilated right ventricle.         Signed by: Tino Mack MD               XR Results (most recent):  Results from [facility] Encounter encounter on 08/16/20   XR CHEST PORT    Narrative PORTABLE CHEST RADIOGRAPH     CPT CODE: 31924     INDICATION: Chest pain, shortness of breath. COMPARISON: 8/16/2020. FINDINGS:    Frontal view of the chest obtained at 0633 hours. Patient is rotated and lungs  are hypoinflated. Cardiomediastinal silhouette is unchanged. Similar central  venous congestion and retrocardiac density. Suspect a small left pleural  effusion. No significant right pleural effusion. No pneumothorax. Impression IMPRESSION:    Similar central venous congestion, suspected small left pleural effusion and  retrocardiac density, likely subsegmental atelectasis. Assessment:     Hospital Problems  Never Reviewed          Codes Class Noted POA    Acute CHF (congestive heart failure) ([facility] ICD-10-CM: I50.9  ICD-9-CM: 428.0  8/16/2020 Unknown                   History of Present Illness: This is a 71 y.o. female admitted for Acute CHF (congestive heart failure) ([facility] [I50.9]. Patient with PMHx of  hypothyroid and morbid obesity who presented with acute shortness of breath while walking her dog. She had associated with chest pressure but no chest palpitation. No fever, no cough. She denied recent sick contact. She was recently with symptom of renal stone but otherwise has been health. She denied leg swelling in the last days. In the ER, she was found tachycardic and had elevated troponin, CXR with pulmonary edema, CTA chest was without PE. She was given IV lasix. COVID-19 was suspected and sent. She has been awaiting for room at [facility] ER for the last days. Cardiology consulted for new onset CHF. On my exam patient sitting on the bed. Report feeling better SOB and orthopnea improving well. She denies any prior history of CAD or CHF. No fever no chills. No N/V. C/o palpitations and orthopnea.          Past Medical History:     Past Medical History:   Diagnosis Date    Hypothyroid     Obesity          Social History:     Social History Socioeconomic History    Marital status: SINGLE     Spouse name: Not on file    Number of children: Not on file    Years of education: Not on file    Highest education level: Not on file        Family History:   History reviewed. No pertinent family history.      Medications:   No Known Allergies     Current Facility-Administered Medications   Medication Dose Route Frequency    sodium chloride (NS) flush 5-40 mL  5-40 mL IntraVENous Q8H    sodium chloride (NS) flush 5-40 mL  5-40 mL IntraVENous PRN    acetaminophen (TYLENOL) tablet 650 mg  650 mg Oral Q6H PRN    Or    acetaminophen (TYLENOL) suppository 650 mg  650 mg Rectal Q6H PRN    polyethylene glycol (MIRALAX) packet 17 g  17 g Oral DAILY PRN    promethazine (PHENERGAN) tablet 12.5 mg  12.5 mg Oral Q6H PRN    Or    ondansetron (ZOFRAN) injection 4 mg  4 mg IntraVENous Q6H PRN    famotidine (PEPCID) tablet 20 mg  20 mg Oral BID    furosemide (LASIX) injection 40 mg  40 mg IntraVENous BID    aspirin delayed-release tablet 81 mg  81 mg Oral DAILY    azithromycin (ZITHROMAX) 500 mg in  mL  500 mg IntraVENous Q24H    cefTRIAXone (ROCEPHIN) 1 g in sterile water (preservative free) 10 mL IV syringe  1 g IntraVENous Q24H    enoxaparin (LOVENOX) injection 40 mg  40 mg SubCUTAneous Q24H    carvediloL (COREG) tablet 3.125 mg  3.125 mg Oral BID WITH MEALS    lisinopriL (PRINIVIL, ZESTRIL) tablet 2.5 mg  2.5 mg Oral DAILY        Review Of Systems:           Constitutional: No fever, no chills, no weight loss, no night sweats   HEENT: No epistaxis, no nasal drainage, no difficulty in swallowing, no redness in eyes  Respiratory:  dyspnea on exertion  Cardiovascular: orthopnea, dyspnea,palpitations  Gastrointestinal: no abd pain, no vomiting, no diarrhea, no bleeding symptoms  Genitourinary: No urinary symptoms or hematuria  Integument/breast: No ulcers or rashes  Musculoskeletal: no muscle pain, no weakness  Neurological: No focal weakness, no seizures, no headaches  Behvioral/Psych: No anxiety, no depression         Physical Exam:     Visit Vitals  /78 (BP 1 Location: Left arm, BP Patient Position: At rest)   Pulse 97   Temp 98.8 °F (37.1 °C)   Resp 20   Ht 5' 2\" (1.575 m)   Wt 108.9 kg (240 lb)   SpO2 95%   BMI 43.90 kg/m²     BP Readings from Last 3 Encounters:   08/18/20 110/78     Pulse Readings from Last 3 Encounters:   08/18/20 97     Wt Readings from Last 3 Encounters:   08/18/20 108.9 kg (240 lb)       General:  alert, cooperative, no distress, appears stated age, morbidly obese  Skin: Warm and dry, acyanotic, normal color. Head: Normocephalic, atraumatic. Eyes: Sclerae anicteric, conjunctivae without injection. Neck:  nontender, no nuchal rigidity, no masses, no stridor, no carotid bruit, no JVD  Lungs:  Moca crackles at the bases of  both the left and right lungs  Heart:  regular rate and rhythm, S1, S2 normal, S3 present, systolic murmur: holosystolic 3/6, blowing at lower left sternal border, at apex, throughout the precordium, no click, no rub  Abdomen:  abdomen is soft without significant tenderness, masses, organomegaly or guarding  Extremities:  extremities normal, atraumatic, no cyanosis, trace edema. Peripheral pulses present   Neurological: grossly intact. No focal abnormalities, moves all extremities well. Psychiatric Affect: The patient is awake, alert and oriented x3. Felicita Needs is interactive and appropriate.    Data Review:     Recent Results (from the past 48 hour(s))   TROPONIN I    Collection Time: 08/16/20 11:49 PM   Result Value Ref Range    Troponin-I, QT 0.17 (H) 0.0 - 0.045 NG/ML   EKG, 12 LEAD, INITIAL    Collection Time: 08/17/20  7:01 AM   Result Value Ref Range    Ventricular Rate 90 BPM    Atrial Rate 90 BPM    P-R Interval 152 ms    QRS Duration 156 ms    Q-T Interval 420 ms    QTC Calculation (Bezet) 513 ms    Calculated P Axis 51 degrees    Calculated R Axis -62 degrees    Calculated T Axis 65 degrees Diagnosis       Normal sinus rhythm  Possible Left atrial enlargement  Left axis deviation  Nonspecific intraventricular block  Lateral infarct , age undetermined  Abnormal ECG  When compared with ECG of 16-AUG-2020 10:11,  premature atrial complexes are no longer present  Nonspecific intraventricular block has replaced Left bundle branch block  Lateral infarct is now present  Confirmed by Armida Gonzalez ([id number]) on 8/17/2020 7:48:20 PM     TROPONIN I    Collection Time: 08/17/20  7:15 AM   Result Value Ref Range    Troponin-I, QT 0.12 (H) 0.0 - 0.045 NG/ML   EKG, 12 LEAD, SUBSEQUENT    Collection Time: 08/18/20  8:52 AM   Result Value Ref Range    Ventricular Rate 104 BPM    Atrial Rate 104 BPM    P-R Interval 136 ms    QRS Duration 154 ms    Q-T Interval 398 ms    QTC Calculation (Bezet) 523 ms    Calculated P Axis 37 degrees    Calculated R Axis -24 degrees    Calculated T Axis 41 degrees    Diagnosis       Sinus tachycardia with occasional premature ventricular complexes  Left bundle branch block  Abnormal ECG  When compared with ECG of 17-AUG-2020 07:01,  premature ventricular complexes are now present  Left bundle branch block has replaced Nonspecific intraventricular block  Criteria for Lateral infarct are no longer present  Confirmed by Swathi Foster ([id number]) on 8/18/2020 10:17:25 AM     ECHO ADULT FOLLOW-UP OR LIMITED    Collection Time: 08/18/20 10:46 AM   Result Value Ref Range    IVSd 0.51 (A) 0.6 - 0.9 cm    LVIDd 6.19 (A) 3.9 - 5.3 cm    LVIDs 5.10 cm    LVPWd 1.15 (A) 0.6 - 0.9 cm    Tapse 3.10 (A) 1.5 - 2.0 cm    Triscuspid Valve Regurgitation Peak Gradient 33.07 mmHg    TR Max Velocity 287.51 cm/s    LV Mass .7 67 - 162 g    LV Mass AL Index 99.6 43 - 95 g/m2   METABOLIC PANEL, COMPREHENSIVE    Collection Time: 08/18/20 11:08 AM   Result Value Ref Range    Sodium 143 136 - 145 mmol/L    Potassium 3.6 3.5 - 5.5 mmol/L    Chloride 111 100 - 111 mmol/L    CO2 25 21 - 32 mmol/L    Anion gap 7 3.0 - 18 mmol/L    Glucose 105 (H) 74 - 99 mg/dL    BUN 15 7.0 - 18 MG/DL    Creatinine 0.63 0.6 - 1.3 MG/DL    BUN/Creatinine ratio 24 (H) 12 - 20      GFR est AA >60 >60 ml/min/1.73m2    GFR est non-AA >60 >60 ml/min/1.73m2    Calcium 8.5 8.5 - 10.1 MG/DL    Bilirubin, total 1.0 0.2 - 1.0 MG/DL    ALT (SGPT) 78 (H) 13 - 56 U/L    AST (SGOT) 40 (H) 10 - 38 U/L    Alk. phosphatase 63 45 - 117 U/L    Protein, total 6.5 6.4 - 8.2 g/dL    Albumin 3.4 3.4 - 5.0 g/dL    Globulin 3.1 2.0 - 4.0 g/dL    A-G Ratio 1.1 0.8 - 1.7     MAGNESIUM    Collection Time: 08/18/20 11:08 AM   Result Value Ref Range    Magnesium 2.0 1.6 - 2.6 mg/dL   CBC WITH AUTOMATED DIFF    Collection Time: 08/18/20 11:08 AM   Result Value Ref Range    WBC 5.2 4.6 - 13.2 K/uL    RBC 4.90 4.20 - 5.30 M/uL    HGB 13.6 12.0 - 16.0 g/dL    HCT 41.4 35.0 - 45.0 %    MCV 84.5 74.0 - 97.0 FL    MCH 27.8 24.0 - 34.0 PG    MCHC 32.9 31.0 - 37.0 g/dL    RDW 16.0 (H) 11.6 - 14.5 %    PLATELET 196 013 - 562 K/uL    MPV 10.6 9.2 - 11.8 FL    NEUTROPHILS 64 40 - 73 %    LYMPHOCYTES 23 21 - 52 %    MONOCYTES 10 3 - 10 %    EOSINOPHILS 2 0 - 5 %    BASOPHILS 1 0 - 2 %    ABS. NEUTROPHILS 3.3 1.8 - 8.0 K/UL    ABS. LYMPHOCYTES 1.2 0.9 - 3.6 K/UL    ABS. MONOCYTES 0.5 0.05 - 1.2 K/UL    ABS. EOSINOPHILS 0.1 0.0 - 0.4 K/UL    ABS.  BASOPHILS 0.1 0.0 - 0.1 K/UL    DF AUTOMATED     CARDIAC PANEL,(CK, CKMB & TROPONIN)    Collection Time: 08/18/20 11:08 AM   Result Value Ref Range    CK - MB 2.3 <3.6 ng/ml    CK-MB Index 3.1 0.0 - 4.0 %    CK 75 26 - 192 U/L    Troponin-I, QT 0.05 (H) 0.0 - 0.045 NG/ML   NT-PRO BNP    Collection Time: 08/18/20 11:08 AM   Result Value Ref Range    NT pro-BNP 6,755 (H) 0 - 900 PG/ML   HEMOGLOBIN A1C WITH EAG    Collection Time: 08/18/20 11:08 AM   Result Value Ref Range    Hemoglobin A1c 5.2 4.2 - 5.6 %    Est. average glucose 103 mg/dL   LIPID PANEL    Collection Time: 08/18/20 11:08 AM   Result Value Ref Range    LIPID PROFILE Cholesterol, total 142 <200 MG/DL    Triglyceride 75 <150 MG/DL    HDL Cholesterol 52 40 - 60 MG/DL    LDL, calculated 75 0 - 100 MG/DL    VLDL, calculated 15 MG/DL    CHOL/HDL Ratio 2.7 0 - 5.0     LD    Collection Time: 08/18/20 11:08 AM   Result Value Ref Range     81 - 234 U/L   FERRITIN    Collection Time: 08/18/20 11:08 AM   Result Value Ref Range    Ferritin 26 8 - 388 NG/ML   C REACTIVE PROTEIN, QT    Collection Time: 08/18/20 11:08 AM   Result Value Ref Range    C-Reactive protein 1.0 (H) 0 - 0.3 mg/dL   D DIMER    Collection Time: 08/18/20 11:08 AM   Result Value Ref Range    D DIMER 0.44 <0.46 ug/ml(FEU)   PROCALCITONIN    Collection Time: 08/18/20 11:08 AM   Result Value Ref Range    Procalcitonin <0.05 ng/mL   LACTIC ACID    Collection Time: 08/18/20 11:08 AM   Result Value Ref Range    Lactic acid 0.9 0.4 - 2.0 MMOL/L       No intake or output data in the 24 hours ending 08/18/20 1630    Cardiographics:       EKG Results     Procedure 720 Value Units Date/Time    EKG, 12 LEAD, SUBSEQUENT [[id number]] Collected:  08/18/20 0852    Order Status:  Completed Updated:  08/18/20 1017     Ventricular Rate 104 BPM      Atrial Rate 104 BPM      P-R Interval 136 ms      QRS Duration 154 ms      Q-T Interval 398 ms      QTC Calculation (Bezet) 523 ms      Calculated P Axis 37 degrees      Calculated R Axis -24 degrees      Calculated T Axis 41 degrees      Diagnosis --     Sinus tachycardia with occasional premature ventricular complexes  Left bundle branch block  Abnormal ECG  When compared with ECG of 17-AUG-2020 07:01,  premature ventricular complexes are now present  Left bundle branch block has replaced Nonspecific intraventricular block  Criteria for Lateral infarct are no longer present  Confirmed by Rusty Garcia ([id number]) on 8/18/2020 10:17:25 AM      EKG, 12 LEAD, INITIAL [[id number]] Collected:  08/17/20 0701    Order Status:  Completed Updated:  08/17/20 1948     Ventricular Rate 90 BPM      Atrial Rate 90 BPM      P-R Interval 152 ms      QRS Duration 156 ms      Q-T Interval 420 ms      QTC Calculation (Bezet) 513 ms      Calculated P Axis 51 degrees      Calculated R Axis -62 degrees      Calculated T Axis 65 degrees      Diagnosis --     Normal sinus rhythm  Possible Left atrial enlargement  Left axis deviation  Nonspecific intraventricular block  Lateral infarct , age undetermined  Abnormal ECG  When compared with ECG of 16-AUG-2020 10:11,  premature atrial complexes are no longer present  Nonspecific intraventricular block has replaced Left bundle branch block  Lateral infarct is now present  Confirmed by Shahid Fritz [id number]) on 8/17/2020 7:48:20 PM      EKG, 12 LEAD, INITIAL [[id number]] Collected:  08/16/20 1011    Order Status:  Completed Updated:  08/16/20 1625     Ventricular Rate 126 BPM      Atrial Rate 126 BPM      P-R Interval 130 ms      QRS Duration 148 ms      Q-T Interval 356 ms      QTC Calculation (Bezet) 515 ms      Calculated P Axis -2 degrees      Calculated R Axis -49 degrees      Calculated T Axis 82 degrees      Diagnosis --     Sinus tachycardia with premature atrial complexes  Left axis deviation  Left bundle branch block  Abnormal ECG  No previous ECGs available  Confirmed by Carol Saucedo ([id number]) on 8/16/2020 4:25:04 PM          08/16/20   ECHO ADULT FOLLOW-UP OR LIMITED 08/18/2020 8/18/2020    Narrative · LV: Estimated LVEF is <15%. Visually measured ejection fraction. Normal   wall thickness. Moderately dilated left ventricle. Severely reduced   systolic function. · TV: Mild tricuspid valve regurgitation is present. · MV: Mild to moderate mitral valve regurgitation is present. · AV: Mild aortic valve regurgitation is present. · PA: Mild pulmonary hypertension. Pulmonary arterial systolic pressure is   41 mmHg. · COVID r/o  · RV: Mildly dilated right ventricle.         Signed by: Michele Wilkins MD         Signed By: Alexandre Xavier Dr NP-C Phone [phone] August 18, 2020      I have independently evaluated and examined the patient. All relevant labs and testing data are reviewed. Care plan discussed and updated after review.   Brian Cooper MD

## 2020-08-18 NOTE — PROGRESS NOTES
Problem: Mobility Impaired (Adult and Pediatric)  Goal: *Acute Goals and Plan of Care (Insert Text)  Description: Physical Therapy Goals  Initiated 8/18/2020 and to be accomplished within 7 day(s)  1. Patient will move from supine to sit and sit to supine  in bed with independence. 2.  Patient will transfer from bed to chair and chair to bed with modified independence using the least restrictive device. 3.  Patient will perform sit to stand with modified independence. 4.  Patient will ambulate with modified independence for 300 feet with the least restrictive device. 5.  Patient will ascend/descend 3 stairs with left sided handrail with modified independence. PLOF: Pt was previously living alone and independent with all activities. She was able to go on walks without any difficulty or SOB without use of an AD. Pt states she has close neighbors that are able to check up on her when needed. Outcome: Progressing Towards Goal   PHYSICAL THERAPY EVALUATION    Patient: Hartwell Severs (40 y.o. female)  Date: 8/18/2020  Primary Diagnosis: Acute CHF (congestive heart failure) ([facility] [I50.9]        Precautions:  Fall, DNR    ASSESSMENT :  Based on the objective data described below, the patient presents with decreased strength, endurance, and SOB with exertion. Nurse cleared PT and OT to work with pt. Patient seen with both PT and OT to enhance patient safety and mobility. Pt found sitting EOB and greeted clinicians. On exam, pt presents with slight L LE weakness compared to right. Pt states the left is the side she usually limps on but declines any pain. Per pt, patient fell in March and R knee bruising observed. HR prior to PT session 95 bpm. Gait belt used for mobility. Pt able to sit<>stand with no difficulty without use of an AD, CGA. Pt amb x30 feet without an AD, CGA but has increased SOB and fatigue and required a seated rest. HR increased to 122 bpm after ambulation.  MD briefly prevalent in room to speak with patient. Pt states she \"doesn't do stairs well\" and had increased SOB with standing hip marches. Patient needed constant verbal cueing to sit down to take seated rest break after bouts of activity. Pt left sitting EOB, call bell nearby, no new questions or concerns. Patient will benefit from continued endurance and strength training as SOB is still present with limited exertion. Recommend discharge home with [facility] for safety evaluation vs rehab. Patient will benefit from skilled intervention to address the above impairments. Patient's rehabilitation potential is considered to be Good  Factors which may influence rehabilitation potential include:   []         None noted  []         Mental ability/status  []         Medical condition  []         Home/family situation and support systems  [x]         Safety awareness  []         Pain tolerance/management  []         Other:      PLAN :  Recommendations and Planned Interventions:   [x]           Bed Mobility Training             [x]    Neuromuscular Re-Education  [x]           Transfer Training                   []    Orthotic/Prosthetic Training  [x]           Gait Training                          []    Modalities  [x]           Therapeutic Exercises           []    Edema Management/Control  [x]           Therapeutic Activities            [x]    Family Training/Education  [x]           Patient Education  []           Other (comment):    Frequency/Duration: Patient will be followed by physical therapy 1-2 times per day/4-7 days per week to address goals. Discharge Recommendations: Rehab vs Home Health  Further Equipment Recommendations for Discharge: TBD as patient progresses     SUBJECTIVE:   Patient stated I am used to taking my dog on walks no problem.     OBJECTIVE DATA SUMMARY:     Past Medical History:   Diagnosis Date    Hypothyroid     Obesity      Past Surgical History:   Procedure Laterality Date    HX OTHER SURGICAL  2016    neck lift Barriers to Learning/Limitations: None  Compensate with: N/A  Home Situation:  Home Situation  Home Environment: Private residence  # Steps to Enter: 3(second home has 2 steps)  Rails to [employer]: Yes  One/Two Story Residence: One story  Living Alone: Yes  Support Systems: Friends \ neighbors  Patient Expects to be Discharged to[de-identified] Private residence  Current DME Used/Available at Home: None  Critical Behavior:  Neurologic State: Alert  Orientation Level: Oriented X4  Cognition: Follows commands  Safety/Judgement: Decreased awareness of need for safety    Strength:    Strength: Generally decreased, functional       Sensation: Intact       Range Of Motion:  AROM: Within functional limits           PROM: Within functional limits    Transfers:  Sit to Stand: Contact guard assistance  Stand to Sit: Contact guard assistance       Balance:   Sitting: Intact; Without support  Standing: Impaired; Without support  Static: good  Dynamic: impaired    Ambulation/Gait Training:  Distance (ft): 30 Feet (ft)  Assistive Device: Other (comment)(none)  Ambulation - Level of Assistance: Contact guard assistance     Gait Description (WDL): Within defined limits  Gait Abnormalities: Other(Increased sway bilaterally)        Base of Support: Widened     Speed/Breanna: Pace decreased (<100 feet/min)    Therapeutic Exercises:   Pt performed standing alternating hip marches using left bed rail as support x10 repetitions. Increased SOB afterwards. Pain:  Pain level pre-treatment: 0/10   Pain level post-treatment: 0/10   Pain Intervention(s) : Medication (see MAR); Rest,Repositioning  Response to intervention: Nurse notified, See doc flow    Activity Tolerance:   Pt only able to tolerate small bouts of activity at this time before getting SOB and needing seated break. Please refer to the flowsheet for vital signs taken during this treatment.   After treatment:   []         Patient left in no apparent distress sitting up in chair  [x] Patient left in no apparent distress sitting EOB  [x]         Call bell left within reach  [x]         Nursing notified  []         Caregiver present  []         Bed alarm activated  []         SCDs applied    COMMUNICATION/EDUCATION:   [x]         Role of Physical Therapy in the acute care setting. [x]         Fall prevention education was provided and the patient/caregiver indicated understanding. [x]         Patient/family have participated as able in goal setting and plan of care. [x]         Patient/family agree to work toward stated goals and plan of care. []         Patient understands intent and goals of therapy, but is neutral about his/her participation. []         Patient is unable to participate in goal setting/plan of care: ongoing with therapy staff.  []         Other:     Thank you for this referral.  Agata Thurston, PT, DPT   Time Calculation: 38 mins      Eval Complexity: History: MEDIUM  Complexity : 1-2 comorbidities / personal factors will impact the outcome/ POC Exam:MEDIUM Complexity : 3 Standardized tests and measures addressing body structure, function, activity limitation and / or participation in recreation  Presentation: MEDIUM Complexity : Evolving with changing characteristics  Clinical Decision Making:Medium Complexity    Overall Complexity:MEDIUM

## 2020-08-18 NOTE — H&P
History & Physical    Patient: Ani Roque MRN: [id number]  CSN: [id number]    YOB: 1955  Age: 71 y.o. Sex: female      DOA: 8/16/2020  CC: shortness of breath    PCP: None       HPI:     Ani Roque is a 71 y.o. female with medical co-morbidities including hypothyroid and morbid obesity who presented with acute shortness of breath while walking her dog. She had associated with chest pressure but no chest palpitation. No fever, no cough. She denied recent sick contact. She was recently with symptom of renal stone but otherwise has been health. She denied leg swelling in the last days. In the ER, she was found tachycardic and had elevated troponin, CXR with pulmonary edema, CTA chest was without PE. She was given IV lasix and cardiology was consulted from ER. COVID-19 was suspected and sent. She has been awaiting for room at [facility] ER for the last days. She came to [facility] this AM for admission       Review of Systems  GENERAL: No fever, No chill, No malaise   HEENT: No change in vision, no ear ache, tinnitus, no sore throat or sinus congestion. NECK: No pain or stiffness. PULMONARY:+ shortness of breath, no cough or wheeze. Cardiovascular: no pnd / orthopnea, no Chest Pain  GASTROINTESTINAL: No abd pain, No nausea/vomiting, No diarrhea, No bright red blood per rectum. GENITOURINARY: No urinary frequency, No urgency or pain with urination. MUSCULOSKELETAL: No joint or muscle pain, no back pain, no recent trauma. DERMATOLOGIC: No rash, no itching, no lesions. ENDOCRINE: No polyuria, polydipsia, NO heat or cold intolerance. No recent change in weight. HEMATOLOGICAL: No easy bruising or bleeding. NEUROLOGIC: No headache, No seizures, No generalized weakness         Past Medical History:   Diagnosis Date    Hypothyroid     Obesity        Past Surgical History:   Procedure Laterality Date    HX OTHER SURGICAL  2016    neck lift        History reviewed.  No pertinent family history. Social History     Socioeconomic History    Marital status: SINGLE     Spouse name: Not on file    Number of children: Not on file    Years of education: Not on file    Highest education level: Not on file       Prior to Admission medications    Medication Sig Start Date End Date Taking? Authorizing Provider   estradioL (VIVELLE) 0.05 mg/24 hr APPLY 1 PATCH ON THE SKIN TWICE A WEEK 7/13/20  Yes Other, MD Cristel   progesterone (PROMETRIUM) 200 mg capsule TAKE 1 CAPSULE BY MOUTH AT BEDTIME 7/7/20  Yes Other, MD Cristel   traZODone (DESYREL) 50 mg tablet TAKE 1 TABLET BY MOUTH ONCE DAILY AT BEDTIME 6/29/20  Yes Other, MD Cristel   NP Thyroid 60 mg tablet TAKE 1 TABLET BY MOUTH ONCE DAILY IN THE MORNING 7/13/20  Yes Other, MD Cristel   buPROPion XL (WELLBUTRIN XL) 150 mg tablet  8/2/20  Yes Other, MD Cristel       No Known Allergies           Physical Exam:      Visit Vitals  /70 (BP 1 Location: Left arm, BP Patient Position: At rest)   Pulse 95   Temp 98.6 °F (37 °C)   Resp 19   Ht 5' 2\" (1.575 m)   Wt 108.9 kg (240 lb)   SpO2 94%   BMI 43.90 kg/m²       Physical Exam:  Tele: sinus  General:  Cooperative, Not in acute distress, speaks in full sentence while in bed  HEENT: PERRL, EOMI, supple neck, no JVD, dry oral mucosa  Cardiovascular: S1S2 regular, no rub/gallop   Pulmonary: air entry bilaterally, no wheezing, ++ crackle  GI:  Soft, non tender, non distended, +bs, no guarding   Extremities:  No pedal edema, +distal pulses appreciated   Neuro: AOx3, moving all extremities, no gross deficit.      Lab/Data Review:  Labs: Results:       Chemistry Recent Labs     08/18/20  1108 08/16/20  1035   * 136*    140   K 3.6 3.7    109   CO2 25 20*   BUN 15 15   CREA 0.63 0.70   CA 8.5 8.3*   AGAP 7 11   BUCR 24* 21*   AP 63 80   TP 6.5 7.0   ALB 3.4 4.0   GLOB 3.1 3.0   AGRAT 1.1 1.3      CBC w/Diff Recent Labs     08/18/20  1108 08/16/20  1035   WBC 5.2 8.8   RBC 4.90 5.54*   HGB 13.6 15.2 HCT 41.4 46.5*    204   GRANS 64 80*   LYMPH 23 11*   EOS 2 0      Coagulation No results for input(s): PTP, INR, APTT, INREXT, INREXT in the last 72 hours. Iron/Ferritin No results for input(s): IRON in the last 72 hours. No lab exists for component: TIBCCALC   BNP No results for input(s): BNPP in the last 72 hours. Cardiac Enzymes Recent Labs     08/18/20  1108   CPK 75   CKND1 3.1      Liver Enzymes Recent Labs     08/18/20  1108   TP 6.5   ALB 3.4   AP 63      Thyroid Studies Lab Results   Component Value Date/Time    TSH 2.50 08/16/2020 02:50 PM          All Micro Results     Procedure Component Value Units Date/Time    CULTURE, BLOOD [[id number]] Collected:  08/18/20 1118    Order Status:  Completed Specimen:  Blood Updated:  08/18/20 1203    CULTURE, BLOOD [[id number]] Collected:  08/18/20 1108    Order Status:  Completed Specimen:  Blood Updated:  08/18/20 1202    CULTURE, RESPIRATORY/SPUTUM/BRONCH [place] STAIN [[id number]]     Order Status:  Sent Specimen:  Sputum     LEGIONELLA PNEUMOPHILA AG, URINE [[id number]]     Order Status:  Sent Specimen:  Urine     STREP PNEUMO AG, URINE [[id number]]     Order Status:  Sent Specimen:  Urine           Imaging Reviewed:  CT Results (most recent):  Results from Hospital Encounter encounter on 08/16/20   CTA CHEST W OR W WO CONT    Narrative EXAM: CTA CHEST W OR W WO CONT    CLINICAL INDICATION/HISTORY : dyspnea, elevated d-dimer, CHF, eval for PE.    COMPARISON: Chest x-ray same day    TECHNIQUE: Thin section axial images were obtained from the thoracic inlet  through the upper abdomen after the uneventful administration of IV contrast.   Utilization of smart prep dynamic bolus enhancement, timing centered for  pulmonary artery delineation. Coronal and sagittal maximum intensity projection  (MIP) reconstructions were post-processed and utilized to better define  pulmonary artery anatomy and increase sensitivity for detecting emboli.  80 cc   Isovue-370 IV    All CT scans at this facility are performed using dose optimization of technique  as appropriate to a performed exam, to include automated exposure control,  adjustment of the mA and/or kV according to patient size (including appropriate  matching for site specific examinations), or use of iterative reconstruction  technique. FINDINGS:    Lungs: Diffuse interstitial thickening. Patchy groundglass opacities in all  lobes. Thyroid and chest wall: Unremarkable    Heart: No cardiomegaly or pericardial effusion. Aorta: Unremarkable for age    Pleural spaces: Small right pleural effusion. Trace left pleural effusion    Pulmonary Arteries: Diagnostic evaluation of the pulmonary arteries. No  intraluminal filling defect to suggest acute pulmonary embolism. Adenopathy: None. Upper abdomen: Large hiatal hernia    Bones: Unremarkable for age      Impression IMPRESSION:    1. No evidence for pulmonary embolism. .  2.  Mild diffuse interstitial prominence, likely edema. 3.  Patchy subtle groundglass opacities throughout the lungs bilaterally which  are nonspecific but could be due to edema or infectious or inflammatory process. Findings could be seen in covid pneumonia although are not typical appearance. Constellation of findings favors CHF. 4.  Small right and trace left pleural effusion. 5.  Large hiatal hernia         XR Results (most recent):  Results from Hospital Encounter encounter on 08/16/20   XR CHEST PORT    Narrative PORTABLE CHEST RADIOGRAPH     CPT CODE: 32590     INDICATION: Chest pain, shortness of breath. COMPARISON: 8/16/2020. FINDINGS:    Frontal view of the chest obtained at 0633 hours. Patient is rotated and lungs  are hypoinflated. Cardiomediastinal silhouette is unchanged. Similar central  venous congestion and retrocardiac density. Suspect a small left pleural  effusion. No significant right pleural effusion. No pneumothorax.       Impression IMPRESSION:    Similar central venous congestion, suspected small left pleural effusion and  retrocardiac density, likely subsegmental atelectasis. Assessment:   Active Problems:  1)  Acute CHF (congestive heart failure), unspecified, improved in proBNP  2)  Elevated troponin, likely demand ischemia   3)  Sinus tachycardia, resolved   4)  Bilateral groundglass opacities on CTA chest, suspected COVID-19 infection, possible CAP   5)  Hypothyroidism   6)  Hyperglycemia, resolved   7)  Morbid obesity       Plan:     Admitted to telemetry, will continue Lasix IV BID. Check Echo today. Check cardiac enzyme today  Cardiology follow up. IO monitor, fluid restriction, low salt diet. Will need further cardiac workup, might need stress test tomorrow   Start ceftriaxone/azithromycin, culture follow up. Covid-19 test follow up, droplet precaution   Check TSH, FT4. Check HgbA1c and lipid panel. Start her on ASA, statin.    pepcid  ICS       Risk of deterioration:  []Low    [x]Moderate  []High     Prophylaxis:  []Lovenox  []Coumadin  [x]Hep SQ  []SCDs  []H2B/PPI     Disposition:  []Home w/ Family   [x] PT,OT,RN   []SNF/LTC   []SAH/Rehab     Discussed Code Status:         []Full Code      [x]DNR         ___________________________________________________     Care Plan discussed with:    [x]Patient   []Family    []ED Care Manager  [x]ED Doc   [x]Specialist :  Total Time Coordinating Admission:   60   minutes    []Total Critical Care Time:       Sumi Matias MD  8/18/2020, 8:36 AM

## 2020-08-18 NOTE — ROUTINE PROCESS
TRANSFER - OUT REPORT: 
 
Verbal report given to Melani Javier RN(name) on Sharifa Mcgee  being transferred to  [place] [id number]-(unit) for routine progression of care Report consisted of patients Situation, Background, Assessment and  
Recommendations(SBAR). Information from the following report(s) SBAR, ED Summary, Procedure Summary, Intake/Output, MAR, Recent Results, Med Rec Status, Cardiac Rhythm ST, BBB, PAC and Alarm Parameters  was reviewed with the receiving nurse. Lines:  
Peripheral IV 08/16/20 Right Antecubital (Active) Site Assessment Clean, dry, & intact 08/16/20 1011 Dressing Status Clean, dry, & intact 08/16/20 1011 Dressing Type Transparent 08/16/20 1011 Hub Color/Line Status Flushed 08/16/20 1011 Peripheral IV 08/16/20 Right Arm (Active) Site Assessment Clean, dry, & intact 08/16/20 1440 Phlebitis Assessment 0 08/16/20 1440 Infiltration Assessment 0 08/16/20 1440 Dressing Status Clean, dry, & intact 08/16/20 1440 Dressing Type Transparent 08/16/20 1440 Opportunity for questions and clarification was provided. Patient transported with: 
 Monitor O2 @ 2 liters

## 2020-08-19 ENCOUNTER — APPOINTMENT (OUTPATIENT)
Dept: GENERAL RADIOLOGY | Age: 65
DRG: 286 | End: 2020-08-19
Attending: INTERNAL MEDICINE
Payer: MEDICARE

## 2020-08-19 LAB
ANION GAP SERPL CALC-SCNC: 8 MMOL/L (ref 3–18)
BUN SERPL-MCNC: 19 MG/DL (ref 7–18)
BUN/CREAT SERPL: 25 (ref 12–20)
CALCIUM SERPL-MCNC: 9 MG/DL (ref 8.5–10.1)
CHLORIDE SERPL-SCNC: 110 MMOL/L (ref 100–111)
CO2 SERPL-SCNC: 22 MMOL/L (ref 21–32)
CREAT SERPL-MCNC: 0.76 MG/DL (ref 0.6–1.3)
CRP SERPL-MCNC: 0.7 MG/DL (ref 0–0.3)
D DIMER PPP FEU-MCNC: 0.51 UG/ML(FEU)
ERYTHROCYTE [DISTWIDTH] IN BLOOD BY AUTOMATED COUNT: 16.2 % (ref 11.6–14.5)
FERRITIN SERPL-MCNC: 26 NG/ML (ref 8–388)
GLUCOSE SERPL-MCNC: 85 MG/DL (ref 74–99)
HCT VFR BLD AUTO: 41.6 % (ref 35–45)
HGB BLD-MCNC: 13.6 G/DL (ref 12–16)
LDH SERPL L TO P-CCNC: 233 U/L (ref 81–234)
MCH RBC QN AUTO: 27.8 PG (ref 24–34)
MCHC RBC AUTO-ENTMCNC: 32.7 G/DL (ref 31–37)
MCV RBC AUTO: 84.9 FL (ref 74–97)
PLATELET # BLD AUTO: 183 K/UL (ref 135–420)
PMV BLD AUTO: 10.4 FL (ref 9.2–11.8)
POTASSIUM SERPL-SCNC: 3.9 MMOL/L (ref 3.5–5.5)
PROCALCITONIN SERPL-MCNC: <0.05 NG/ML
RBC # BLD AUTO: 4.9 M/UL (ref 4.2–5.3)
SODIUM SERPL-SCNC: 140 MMOL/L (ref 136–145)
WBC # BLD AUTO: 5.9 K/UL (ref 4.6–13.2)

## 2020-08-19 PROCEDURE — 74011250636 HC RX REV CODE- 250/636: Performed by: INTERNAL MEDICINE

## 2020-08-19 PROCEDURE — 85027 COMPLETE CBC AUTOMATED: CPT

## 2020-08-19 PROCEDURE — 74011250637 HC RX REV CODE- 250/637: Performed by: INTERNAL MEDICINE

## 2020-08-19 PROCEDURE — 36415 COLL VENOUS BLD VENIPUNCTURE: CPT

## 2020-08-19 PROCEDURE — 80048 BASIC METABOLIC PNL TOTAL CA: CPT

## 2020-08-19 PROCEDURE — 65660000000 HC RM CCU STEPDOWN

## 2020-08-19 PROCEDURE — 97116 GAIT TRAINING THERAPY: CPT

## 2020-08-19 PROCEDURE — 85379 FIBRIN DEGRADATION QUANT: CPT

## 2020-08-19 PROCEDURE — 86140 C-REACTIVE PROTEIN: CPT

## 2020-08-19 PROCEDURE — 74011000250 HC RX REV CODE- 250: Performed by: INTERNAL MEDICINE

## 2020-08-19 PROCEDURE — 82728 ASSAY OF FERRITIN: CPT

## 2020-08-19 PROCEDURE — 71046 X-RAY EXAM CHEST 2 VIEWS: CPT

## 2020-08-19 PROCEDURE — 84145 PROCALCITONIN (PCT): CPT

## 2020-08-19 PROCEDURE — 83615 LACTATE (LD) (LDH) ENZYME: CPT

## 2020-08-19 PROCEDURE — 97110 THERAPEUTIC EXERCISES: CPT

## 2020-08-19 RX ORDER — SPIRONOLACTONE 25 MG/1
25 TABLET ORAL DAILY
Status: DISCONTINUED | OUTPATIENT
Start: 2020-08-19 | End: 2020-08-22 | Stop reason: HOSPADM

## 2020-08-19 RX ORDER — FUROSEMIDE 20 MG/1
20 TABLET ORAL DAILY
Status: DISCONTINUED | OUTPATIENT
Start: 2020-08-20 | End: 2020-08-22 | Stop reason: HOSPADM

## 2020-08-19 RX ADMIN — CEFTRIAXONE SODIUM 1 G: 1 INJECTION, POWDER, FOR SOLUTION INTRAMUSCULAR; INTRAVENOUS at 08:29

## 2020-08-19 RX ADMIN — Medication 10 ML: at 14:00

## 2020-08-19 RX ADMIN — ASPIRIN 81 MG: 81 TABLET, COATED ORAL at 08:29

## 2020-08-19 RX ADMIN — SPIRONOLACTONE 25 MG: 25 TABLET ORAL at 18:08

## 2020-08-19 RX ADMIN — FAMOTIDINE 20 MG: 20 TABLET, FILM COATED ORAL at 08:29

## 2020-08-19 RX ADMIN — FUROSEMIDE 40 MG: 10 INJECTION, SOLUTION INTRAMUSCULAR; INTRAVENOUS at 08:29

## 2020-08-19 RX ADMIN — CARVEDILOL 3.12 MG: 6.25 TABLET, FILM COATED ORAL at 18:08

## 2020-08-19 RX ADMIN — LISINOPRIL 2.5 MG: 5 TABLET ORAL at 08:29

## 2020-08-19 RX ADMIN — AZITHROMYCIN MONOHYDRATE 500 MG: 500 INJECTION, POWDER, LYOPHILIZED, FOR SOLUTION INTRAVENOUS at 08:28

## 2020-08-19 RX ADMIN — CARVEDILOL 3.12 MG: 6.25 TABLET, FILM COATED ORAL at 08:29

## 2020-08-19 RX ADMIN — FAMOTIDINE 20 MG: 20 TABLET, FILM COATED ORAL at 18:00

## 2020-08-19 RX ADMIN — ENOXAPARIN SODIUM 40 MG: 40 INJECTION SUBCUTANEOUS at 08:28

## 2020-08-19 NOTE — PROGRESS NOTES
Reason for Admission:  Acute CHF (congestive heart failure) ([facility] [address] [I50.9]                 RUR Score:    9%            Plan for utilizing home health:    [facility]                    Likelihood of Readmission:   LOW                         Transition of Care Plan:              Initial assessment completed with patient. Cognitive status of patient: oriented to time, place, person and situation. Face sheet information confirmed:  yes. The patient designates her sister to participate in her discharge plan and to receive any needed information. This patient lives in a single family home alone. Patient is able to navigate steps as needed. Prior to hospitalization, patient was considered to be independent with ADLs/IADLS : yes . Patient has a current ACP document on file: no  The friends will be available to transport patient home upon discharge. The patient already has no medical equipment available in the home. Patient is not currently active with home health. Patient has not stayed in a skilled nursing facility or rehab. This patient is on dialysis :no    List of available Home Health agencies were provided and reviewed with the patient prior to discharge. Freedom of choice signed: yes, for [facility]. Currently, the discharge plan is Home with [address] [place] [address] Hussain Parekh. CM had a conversation with pt about going to rehab per therapy recs if no family support. Pt states that she has a lot of friend and family support and would not want to go to a rehab. She would like to go home with home health. Pt will need set up with a PCP. She is hoping for one in the [facility] area in between her two homes in [address] [place] [address]. The patient states that she can obtain her medications from the pharmacy, and take her medications as directed. Patient's current insurance is Medicare and [employer].                 Chyna Mcneil RN BSN  Care Manager  [phone] No

## 2020-08-19 NOTE — ROUTINE PROCESS
Bedside and Verbal shift change report given to [address] (oncoming nurse) by Arminda Linda RN (offgoing nurse). Report included the following information SBAR, Kardex, Intake/Output, MAR, Recent Results and Cardiac Rhythm NSR. Patient quietly resting in bed. Patient stated she feels better. No sob noted. Call bell and phone in reach.

## 2020-08-19 NOTE — PROGRESS NOTES
[facility]      CARDIOLOGY PROGRESS NOTE  RECS:  1. Acute congestive heart failure-systolic dysfunction: Resolved clinically. Change IV to p.o. Lasix. Add Aldactone. Nuclear stress test tomorrow to evaluate any ischemia. 2. Cardiomyopathy with EF <15 % - unclear etiology. Probably due to. Stress test tomorrow. Discussed with patient    3. Hypertension- labile continue with Coreg and ACEi. Would consider Entresto after diuresis. 4. Suspected COV-19   5. SOB- in the setting #1 and #6  Better   6. Morbid obesity- weight los strongly advised   7. LBBB- unknown onset  8. MR- mild to moderate on Recent Echo     I reviewed the prescription with patient. We do not know how long has she had the cardiomyopathy but at this time but medications are you and her ejection fraction may improve. She would like to be resuscitated during this time. Would recommend to get a LifeVest prior to discharge. May be candidate for ICD in future.       EKG Results     Procedure 720 Value Units Date/Time    EKG, 12 LEAD, SUBSEQUENT [[id number]] Collected:  08/18/20 0852    Order Status:  Completed Updated:  08/18/20 1017     Ventricular Rate 104 BPM      Atrial Rate 104 BPM      P-R Interval 136 ms      QRS Duration 154 ms      Q-T Interval 398 ms      QTC Calculation (Bezet) 523 ms      Calculated P Axis 37 degrees      Calculated R Axis -24 degrees      Calculated T Axis 41 degrees      Diagnosis --     Sinus tachycardia with occasional premature ventricular complexes  Left bundle branch block  Abnormal ECG  When compared with ECG of 17-AUG-2020 07:01,  premature ventricular complexes are now present  Left bundle branch block has replaced Nonspecific intraventricular block  Criteria for Lateral infarct are no longer present  Confirmed by Wenceslao Bates ([id number]) on 8/18/2020 10:17:25 AM      EKG, 12 LEAD, INITIAL [[id number]] Collected:  08/17/20 0701    Order Status:  Completed Updated:  08/17/20 1948 Ventricular Rate 90 BPM      Atrial Rate 90 BPM      P-R Interval 152 ms      QRS Duration 156 ms      Q-T Interval 420 ms      QTC Calculation (Bezet) 513 ms      Calculated P Axis 51 degrees      Calculated R Axis -62 degrees      Calculated T Axis 65 degrees      Diagnosis --     Normal sinus rhythm  Possible Left atrial enlargement  Left axis deviation  Nonspecific intraventricular block  Lateral infarct , age undetermined  Abnormal ECG  When compared with ECG of 16-AUG-2020 10:11,  premature atrial complexes are no longer present  Nonspecific intraventricular block has replaced Left bundle branch block  Lateral infarct is now present  Confirmed by Wesley Ham ([id number]) on 8/17/2020 7:48:20 PM      EKG, 12 LEAD, INITIAL [[id number]] Collected:  08/16/20 1011    Order Status:  Completed Updated:  08/16/20 1625     Ventricular Rate 126 BPM      Atrial Rate 126 BPM      P-R Interval 130 ms      QRS Duration 148 ms      Q-T Interval 356 ms      QTC Calculation (Bezet) 515 ms      Calculated P Axis -2 degrees      Calculated R Axis -49 degrees      Calculated T Axis 82 degrees      Diagnosis --     Sinus tachycardia with premature atrial complexes  Left axis deviation  Left bundle branch block  Abnormal ECG  No previous ECGs available  Confirmed by Beltran Bang ([id number]) on 8/16/2020 4:25:04 PM          XR Results (most recent):  Results from Hospital Encounter encounter on 08/16/20   XR CHEST PA LAT    Narrative EXAMINATION: Chest 2 views    INDICATION: Pneumonia    COMPARISON: 8/17/2020    FINDINGS: Frontal and lateral views of the chest obtained. Borderline prominent  cardiac silhouette. Minimal aortic arch calcifications. Slightly prominent  perihilar interstitium. Retrocardiac opacity with air-fluid level consistent  with hiatal hernia. No evidence of pneumothorax. No acute osseous findings.       Impression IMPRESSION:    Borderline prominent cardiac silhouette with mild perihilar interstitial  infiltrate/edema. Hiatal hernia.       08/16/20   ECHO ADULT FOLLOW-UP OR LIMITED 08/18/2020 8/18/2020    Narrative · LV: Estimated LVEF is <15%. Visually measured ejection fraction. Normal   wall thickness. Moderately dilated left ventricle. Severely reduced   systolic function. · TV: Mild tricuspid valve regurgitation is present. · MV: Mild to moderate mitral valve regurgitation is present. · AV: Mild aortic valve regurgitation is present. · PA: Mild pulmonary hypertension. Pulmonary arterial systolic pressure is   41 mmHg. · COVID r/o  · RV: Mildly dilated right ventricle. Signed by: Alexis Cedeno MD                 ASSESSMENT:  Hospital Problems  Never Reviewed          Codes Class Noted POA    Cardiomyopathy [facility]) ICD-10-CM: I42.9  ICD-9-CM: 425.4  8/18/2020 Unknown        Suspected COVID-19 virus infection ICD-10-CM: Z20.828  ICD-9-CM: V01.79  8/18/2020 Unknown        CAP (community acquired pneumonia) ICD-10-CM: J18.9  ICD-9-CM: 486  8/18/2020 Unknown        Elevated troponin ICD-10-CM: R79.89  ICD-9-CM: 790.6  8/18/2020 Unknown        Acute CHF (congestive heart failure) ([facility] [address] ICD-10-CM: I50.9  ICD-9-CM: 428.0  8/16/2020 Unknown                SUBJECTIVE:  No chest pain  Improved shortness of breath    OBJECTIVE:    VS:   Visit Vitals  BP 95/64 (BP 1 Location: Left arm, BP Patient Position: At rest)   Pulse 76   Temp 97.8 °F (36.6 °C)   Resp 20   Ht 5' 2\" (1.575 m)   Wt 109.7 kg (241 lb 14.6 oz)   SpO2 93%   BMI 44.25 kg/m²         Intake/Output Summary (Last 24 hours) at 8/19/2020 1415  Last data filed at 8/19/2020 1132  Gross per 24 hour   Intake 280 ml   Output 1550 ml   Net -1270 ml     TELE: normal sinus rhythm    General: alert and in no apparent distress  HENT: Normocephalic, atraumatic. Normal external eye.   Neck :  no bruit, no JVD, JVD difficult to assess due to obesity  Cardiac:  regular rate and rhythm, S1, S2 normal, no murmur, click, rub or gallop  Chest/Lungs:chest clear, no wheezing, rales, normal symmetric air entry  Abdomen: Soft, nontender, no masses  Extremities:  No c/c/edema, peripheral pulses present      Labs: Results:       Chemistry Recent Labs     08/19/20  0215 08/18/20  1108   GLU 85 105*    143   K 3.9 3.6    111   CO2 22 25   BUN 19* 15   CREA 0.76 0.63   CA 9.0 8.5   MG  --  2.0   AGAP 8 7   BUCR 25* 24*   AP  --  63   TP  --  6.5   ALB  --  3.4   GLOB  --  3.1   AGRAT  --  1.1      CBC w/Diff Recent Labs     08/19/20  0215 08/18/20  1108   WBC 5.9 5.2   RBC 4.90 4.90   HGB 13.6 13.6   HCT 41.6 41.4    177   GRANS  --  64   LYMPH  --  23   EOS  --  2      Cardiac Enzymes Recent Labs     08/18/20  1108   CPK 75   CKND1 3.1      Coagulation No results for input(s): PTP, INR, APTT, INREXT in the last 72 hours. Lipid Panel Lab Results   Component Value Date/Time    Cholesterol, total 142 08/18/2020 11:08 AM    HDL Cholesterol 52 08/18/2020 11:08 AM    LDL, calculated 75 08/18/2020 11:08 AM    VLDL, calculated 15 08/18/2020 11:08 AM    Triglyceride 75 08/18/2020 11:08 AM    CHOL/HDL Ratio 2.7 08/18/2020 11:08 AM      BNP No results for input(s): BNPP in the last 72 hours. Liver Enzymes Recent Labs     08/18/20  1108   TP 6.5   ALB 3.4   AP 63      Digoxin    Thyroid Studies Lab Results   Component Value Date/Time    TSH 2.50 08/16/2020 02:50 PM              Martina Valdez MD     Contact numbers:   5 PM to 9 AM: Answering service at [phone]   9 AM to 5 PM: Pager [phone]; if no response, please call through answering service or office.   Office number is [phone] or [phone]

## 2020-08-19 NOTE — PROGRESS NOTES
Hospitalist Progress Note    Patient: Fiordaliza Nassar Age: 71 y.o. : 1955 MR#: [id number] SSN: [id number]  Date/Time: 2020 9:15 AM    DOA: 2020  PCP: None    Subjective:     Still with shortness of breath on exertion. No cough, no fever. No leukocytosis   Low proCalc. No fever. Telemetry with tachyarhythmia    Echo with EF<15 %    NEG COVID-19    Interval Hospital Course:  71 y.o. female with medical co-morbidities including hypothyroid and morbid obesity who presented with acute shortness of breath while walking her dog. She had associated with chest pressure but no chest palpitation. No fever, no cough. She denied recent sick contact. She was recently with symptom of renal stone but otherwise has been health. She denied leg swelling in the last days. In the ER, she was found tachycardic and had elevated troponin, CXR with pulmonary edema, CTA chest was without PE. She was given IV lasix and cardiology was consulted from ER. COVID-19 was suspected and sent. She has been awaiting for room at [facility] ER for the last days. She came to [facility] this AM for admission         ROS: No current fever/chills, no headache, no dizziness, no facial pain, no sinus congestion,   No swallowing pain, No chest pain, no palpitation, + shortness of breath, no abd pain,  No diarrhea, no urinary complaint, no leg pain or swelling      Assessment/Plan:   1. Acute systolic CHF (congestive heart failure), decompensated, improved in proBNP  2)  Elevated troponin, likely demand ischemia vs prior MI  3)  Sinus tachycardia, resolved   4)  Bilateral groundglass opacities on CTA chest, Negative COVID-19 infection,        -possible CAP vs heart hear failure   5)  Hypothyroidism   6)  Hyperglycemia, resolved   7)  Morbid obesity     Continue Lasix BID, carvedilol, lisinopril. Will defer to Cardiology for further obstructive disease workup, Will likely need LifeVest at discharge  Monitor on telemetry.  Fluid restriction, low salt diet   Continue with today antibiotic, ICS and check CXR today. pepcid   Transfer to non-covid section   PT/OT appreciated. DNR    Additional Notes:    Time spent >30 minutes    Case discussed with:  [x]Patient  []Family  [x]Nursing  [x]Case Management  DVT Prophylaxis:  [x]Lovenox  []Hep SQ  []SCDs  []Coumadin   []On Heparin gtt    Signed By: Keven Mccurdy MD     2020 9:15 AM              Objective:   VS:   Visit Vitals  /82 (BP 1 Location: Left arm, BP Patient Position: At rest)   Pulse 78   Temp 97.8 °F (36.6 °C)   Resp 21   Ht 5' 2\" (1.575 m)   Wt 109.7 kg (241 lb 14.6 oz)   SpO2 94%   BMI 44.25 kg/m²      Tmax/24hrs: Temp (24hrs), Av.2 °F (36.8 °C), Min:97.5 °F (36.4 °C), Max:98.8 °F (37.1 °C)      Intake/Output Summary (Last 24 hours) at 2020 0915  Last data filed at 2020 5392  Gross per 24 hour   Intake    Output 1600 ml   Net -1600 ml       Tele: sinus  General:  Cooperative, Not in acute distress, speaks in full sentence while in bed  HEENT: PERRL, EOMI, supple neck, no JVD, dry oral mucosa  Cardiovascular: S1S2 regular, no rub/gallop   Pulmonary: Clear air entry bilaterally, no wheezing, ++ crackle  GI:  Soft, non tender, non distended, +bs, no guarding   Extremities:  No pedal edema, +distal pulses appreciated   Neuro: AOx3, moving all extremities, no gross deficit.      Additional:       Current Facility-Administered Medications   Medication Dose Route Frequency    sodium chloride (NS) flush 5-40 mL  5-40 mL IntraVENous Q8H    sodium chloride (NS) flush 5-40 mL  5-40 mL IntraVENous PRN    acetaminophen (TYLENOL) tablet 650 mg  650 mg Oral Q6H PRN    Or    acetaminophen (TYLENOL) suppository 650 mg  650 mg Rectal Q6H PRN    polyethylene glycol (MIRALAX) packet 17 g  17 g Oral DAILY PRN    promethazine (PHENERGAN) tablet 12.5 mg  12.5 mg Oral Q6H PRN    Or    ondansetron (ZOFRAN) injection 4 mg  4 mg IntraVENous Q6H PRN    famotidine (PEPCID) tablet 20 mg  20 mg Oral BID    aspirin delayed-release tablet 81 mg  81 mg Oral DAILY    azithromycin (ZITHROMAX) 500 mg in  mL  500 mg IntraVENous Q24H    cefTRIAXone (ROCEPHIN) 1 g in sterile water (preservative free) 10 mL IV syringe  1 g IntraVENous Q24H    enoxaparin (LOVENOX) injection 40 mg  40 mg SubCUTAneous Q24H    carvediloL (COREG) tablet 3.125 mg  3.125 mg Oral BID WITH MEALS    lisinopriL (PRINIVIL, ZESTRIL) tablet 2.5 mg  2.5 mg Oral DAILY    furosemide (LASIX) injection 40 mg  40 mg IntraVENous DAILY            Lab/Data Review:  Labs: Results:       Chemistry Recent Labs     08/18/20  1108 08/16/20  1035   * 136*    140   K 3.6 3.7    109   CO2 25 20*   BUN 15 15   CREA 0.63 0.70   BUCR 24* 21*   AGAP 7 11   CA 8.5 8.3*     Recent Labs     08/18/20  1108 08/16/20  1035   ALT 78* 96*   TP 6.5 7.0   ALB 3.4 4.0   GLOB 3.1 3.0   AGRAT 1.1 1.3      CBC w/Diff Recent Labs     08/19/20  0215 08/18/20  1108 08/16/20  1035   WBC 5.9 5.2 8.8   RBC 4.90 4.90 5.54*   HGB 13.6 13.6 15.2   HCT 41.6 41.4 46.5*   MCV 84.9 84.5 83.9   MCH 27.8 27.8 27.4   MCHC 32.7 32.9 32.7   RDW 16.2* 16.0* 16.0*    177 204   GRANS  --  64 80*   LYMPH  --  23 11*   EOS  --  2 0      Coagulation No results for input(s): PTP, INR, APTT, INREXT in the last 72 hours.     Iron/Ferritin Lab Results   Component Value Date/Time    Ferritin 26 08/19/2020 02:15 AM       BNP    Cardiac Enzymes Lab Results   Component Value Date/Time    CK 75 08/18/2020 11:08 AM    CK - MB 2.3 08/18/2020 11:08 AM    CK-MB Index 3.1 08/18/2020 11:08 AM    Troponin-I, QT 0.05 (H) 08/18/2020 11:08 AM        Lactic Acid    Thyroid Studies          All Micro Results     Procedure Component Value Units Date/Time    CULTURE, BLOOD [[id number]] Collected:  08/18/20 1108    Order Status:  Completed Specimen:  Blood Updated:  08/19/20 0751     Special Requests: NO SPECIAL REQUESTS        Culture result: NO GROWTH AFTER 16 HOURS       CULTURE, BLOOD [[id number]] Collected:  08/18/20 1118    Order Status:  Completed Specimen:  Blood Updated:  08/19/20 0751     Special Requests: NO SPECIAL REQUESTS        Culture result: NO GROWTH AFTER 16 HOURS       STREP PNEUMO AG, URINE [[id number]] Collected:  08/18/20 1510    Order Status:  Completed Specimen:  Urine, random Updated:  08/18/20 2008     Strep pneumo Ag, urine Negative       LEGIONELLA PNEUMOPHILA AG, URINE [[id number]] Collected:  08/18/20 1510    Order Status:  Completed Specimen:  Urine, random Updated:  08/18/20 2008     Legionella Ag, urine Negative       CULTURE, RESPIRATORY/SPUTUM/BRONCH [place] [[id number]]     Order Status:  Sent Specimen:  Sputum             Images:    CT (Most Recent). CT Results (most recent):  Results from Hospital Encounter encounter on 08/16/20   CTA CHEST W OR W WO CONT    Narrative EXAM: CTA CHEST W OR W WO CONT    CLINICAL INDICATION/HISTORY : dyspnea, elevated d-dimer, CHF, eval for PE.    COMPARISON: Chest x-ray same day    TECHNIQUE: Thin section axial images were obtained from the thoracic inlet  through the upper abdomen after the uneventful administration of IV contrast.   Utilization of smart prep dynamic bolus enhancement, timing centered for  pulmonary artery delineation. Coronal and sagittal maximum intensity projection  (MIP) reconstructions were post-processed and utilized to better define  pulmonary artery anatomy and increase sensitivity for detecting emboli. 80 cc   Isovue-370 IV    All CT scans at this facility are performed using dose optimization of technique  as appropriate to a performed exam, to include automated exposure control,  adjustment of the mA and/or kV according to patient size (including appropriate  matching for site specific examinations), or use of iterative reconstruction  technique. FINDINGS:    Lungs: Diffuse interstitial thickening. Patchy groundglass opacities in all  lobes.  Thyroid and chest wall: Unremarkable    Heart: No cardiomegaly or pericardial effusion. Aorta: Unremarkable for age    Pleural spaces: Small right pleural effusion. Trace left pleural effusion    Pulmonary Arteries: Diagnostic evaluation of the pulmonary arteries. No  intraluminal filling defect to suggest acute pulmonary embolism. Adenopathy: None. Upper abdomen: Large hiatal hernia    Bones: Unremarkable for age      Impression IMPRESSION:    1. No evidence for pulmonary embolism. .  2.  Mild diffuse interstitial prominence, likely edema. 3.  Patchy subtle groundglass opacities throughout the lungs bilaterally which  are nonspecific but could be due to edema or infectious or inflammatory process. Findings could be seen in covid pneumonia although are not typical appearance. Constellation of findings favors CHF. 4.  Small right and trace left pleural effusion. 5.  Large hiatal hernia            XRAY (Most Recent) XR Results (most recent):  Results from Hospital Encounter encounter on 08/16/20   XR CHEST PORT    Narrative PORTABLE CHEST RADIOGRAPH     CPT CODE: 31862     INDICATION: Chest pain, shortness of breath. COMPARISON: 8/16/2020. FINDINGS:    Frontal view of the chest obtained at 0633 hours. Patient is rotated and lungs  are hypoinflated. Cardiomediastinal silhouette is unchanged. Similar central  venous congestion and retrocardiac density. Suspect a small left pleural  effusion. No significant right pleural effusion. No pneumothorax. Impression IMPRESSION:    Similar central venous congestion, suspected small left pleural effusion and  retrocardiac density, likely subsegmental atelectasis. EKG No results found for this or any previous visit. 2D ECHO 08/16/20   ECHO ADULT FOLLOW-UP OR LIMITED 08/18/2020 8/18/2020    Narrative · LV: Estimated LVEF is <15%. Visually measured ejection fraction. Normal   wall thickness. Moderately dilated left ventricle. Severely reduced   systolic function.   · TV: Mild tricuspid valve regurgitation is present. · MV: Mild to moderate mitral valve regurgitation is present. · AV: Mild aortic valve regurgitation is present. · PA: Mild pulmonary hypertension. Pulmonary arterial systolic pressure is   41 mmHg. · COVID r/o  · RV: Mildly dilated right ventricle.         Signed by: Jadyn Paredes MD

## 2020-08-19 NOTE — ROUTINE PROCESS
Bedside and Verbal shift change report given to Via Nathan Winkler (oncoming nurse) by Bridgett Watkins (offgoing nurse). Report included the following information SBAR, Kardex, Intake/Output, MAR, Recent Results and Cardiac Rhythm NSR. Patient quietly resting in bed. Call bell and phone in reach.

## 2020-08-19 NOTE — PROGRESS NOTES
Problem: Mobility Impaired (Adult and Pediatric)  Goal: *Acute Goals and Plan of Care (Insert Text)  Description: Physical Therapy Goals  Initiated 8/18/2020 and to be accomplished within 7 day(s)  1. Patient will move from supine to sit and sit to supine  in bed with independence. 2.  Patient will transfer from bed to chair and chair to bed with modified independence using the least restrictive device. 3.  Patient will perform sit to stand with modified independence. 4.  Patient will ambulate with modified independence for 300 feet with the least restrictive device. 5.  Patient will ascend/descend 3 stairs with left sided handrail with modified independence. PLOF: Pt was previously living alone and independent with all activities. She was able to go on walks without any difficulty or SOB without use of an AD. Pt states she has close neighbors that are able to check up on her when needed. Outcome: Progressing Towards Goal     PHYSICAL THERAPY TREATMENT    Patient: Merly Koch (41 y.o. female)  Date: 8/19/2020  Diagnosis: Acute CHF [facility]congest[facility] [I50.9]   <principal problem not specified>       Precautions: (standard)    ASSESSMENT:  Pt presents with decreased endurance, strength, and SOB with minimal activity. Nurse cleared PT to work with patient. Pt found sitting EOB, willing to work with PT. Pt reports no pain. HR at beginning of session 96 bpm. Gait belt donned. Pt able to perform sit-to-stands with no AD and CGA x5 in a row with no difficulty, but experienced SOB and required seated rest. Pt cued to take deep breaths and SOB ceased after 1 minute. Pt then amb around bed and back x2 with CGA no AD. Therex on flow sheet performed sitting EOB. Patient reports slight left sided hip flexor insertion pain that eased with activity. To simulate stairs, patient performed high hip marches standing with left bed rail as support.  Pt was able to clear feet from floor well with no difficulty, but did experience SOB. During seated rest, HR 98 bpm. MD present in room at end of PT session. Pt left sitting EOB, speaking with MD, call bell nearby, no new questions or concerns for PT. Nurse aware of patient still presenting with SOB with all mobility. Pt could still benefit from acute care PT to improve dynamic mobility endurance, and strength. Recommend discharge home with home health at this time. Progression toward goals:   []      Improving appropriately and progressing toward goals  [x]      Improving slowly and progressing toward goals  []      Not making progress toward goals and plan of care will be adjusted     PLAN:  Patient continues to benefit from skilled intervention to address the above impairments. Continue treatment per established plan of care. Discharge Recommendations:  Home Health  Further Equipment Recommendations for Discharge:  N/A     SUBJECTIVE:   Patient stated I still feel SOB when I get up.     OBJECTIVE DATA SUMMARY:   Critical Behavior:  Neurologic State: Alert  Orientation Level: Oriented X4  Cognition: Follows commands  Safety/Judgement: Fall prevention, Decreased awareness of need for safety  Functional Mobility Training:      Transfers:  Sit to Stand: Contact guard assistance  Stand to Sit: Contact guard assistance       Balance:  Sitting: Intact  Standing: Impaired; Without support  Standing - Static: Good  Standing - Dynamic : Fair    Ambulation/Gait Training:  Distance (ft): 60 Feet (ft)  Assistive Device: Other (comment)(none)  Ambulation - Level of Assistance: Contact guard assistance        Gait Abnormalities: Trunk sway increased;Decreased step clearance        Base of Support: Widened     Speed/Breanna: Pace decreased (<100 feet/min)    Therapeutic Exercises:   Pt performed flow sheet exercises sitting EOB.       EXERCISE   Sets   Reps   Active Active Assist   Passive Self ROM   Comments   Ankle Pumps    [] [] [] []    Glut Sets 1 10  [x] [] [] [] Hold for 5 secs   Hamstring Sets   [] [] [] []    Short Arc Quads   [] [] [] []    Heel Slides   [] [] [] []    Straight Leg Raises   [] [] [] []    Hip Add 2 10 [x] [] [] [] Hold for 5 secs, w/ pillow squeeze   Long Arc Quads 2 10 [x] [] [] []    Seated Marching 1 10 [x] [] [] []    Standing Marching 1 15 [x] [] [] [] Held onto left bed rail for support      [] [] [] []        Pain:  Pain level pre-treatment: 0/10  Pain level post-treatment: 0/10   Pain Intervention(s): Medication (see MAR); Rest,  Repositioning   Response to intervention: Nurse notified, See doc flow    Activity Tolerance:   Pt still gets SOB after limited activity. Please refer to the flowsheet for vital signs taken during this treatment. After treatment:   [] Patient left in no apparent distress sitting up in chair  [x] Patient left in no apparent distress in bed  [x] Call bell left within reach  [x] Nursing notified  [] Caregiver present  [] Bed alarm activated  [] SCDs applied      COMMUNICATION/EDUCATION:   [x]         Role of Physical Therapy in the acute care setting. [x]         Fall prevention education was provided and the patient/caregiver indicated understanding. []         Patient/family have participated as able in working toward goals and plan of care. []         Patient/family agree to work toward stated goals and plan of care. []         Patient understands intent and goals of therapy, but is neutral about his/her participation.   []         Patient is unable to participate in stated goals/plan of care: ongoing with therapy staff.  []         Other:        Yaniv Tapia   Time Calculation: 24 mins

## 2020-08-19 NOTE — PROGRESS NOTES
Problem: Falls - Risk of  Goal: *Absence of Falls  Description: Document Selina Steele Fall Risk and appropriate interventions in the flowsheet.   Note: Fall Risk Interventions:            Medication Interventions: Evaluate medications/consider consulting pharmacy

## 2020-08-20 ENCOUNTER — APPOINTMENT (OUTPATIENT)
Dept: NON INVASIVE DIAGNOSTICS | Age: 65
DRG: 286 | End: 2020-08-20
Attending: INTERNAL MEDICINE
Payer: MEDICARE

## 2020-08-20 LAB
ANION GAP SERPL CALC-SCNC: 5 MMOL/L (ref 3–18)
BUN SERPL-MCNC: 20 MG/DL (ref 7–18)
BUN/CREAT SERPL: 29 (ref 12–20)
CALCIUM SERPL-MCNC: 9 MG/DL (ref 8.5–10.1)
CHLORIDE SERPL-SCNC: 109 MMOL/L (ref 100–111)
CO2 SERPL-SCNC: 26 MMOL/L (ref 21–32)
CREAT SERPL-MCNC: 0.69 MG/DL (ref 0.6–1.3)
ERYTHROCYTE [DISTWIDTH] IN BLOOD BY AUTOMATED COUNT: 16.2 % (ref 11.6–14.5)
GLUCOSE SERPL-MCNC: 92 MG/DL (ref 74–99)
HCT VFR BLD AUTO: 42.6 % (ref 35–45)
HGB BLD-MCNC: 13.8 G/DL (ref 12–16)
MAGNESIUM SERPL-MCNC: 2.1 MG/DL (ref 1.6–2.6)
MCH RBC QN AUTO: 27.8 PG (ref 24–34)
MCHC RBC AUTO-ENTMCNC: 32.4 G/DL (ref 31–37)
MCV RBC AUTO: 85.7 FL (ref 74–97)
PLATELET # BLD AUTO: 187 K/UL (ref 135–420)
PMV BLD AUTO: 10.2 FL (ref 9.2–11.8)
POTASSIUM SERPL-SCNC: 3.7 MMOL/L (ref 3.5–5.5)
PROCALCITONIN SERPL-MCNC: <0.05 NG/ML
RBC # BLD AUTO: 4.97 M/UL (ref 4.2–5.3)
SODIUM SERPL-SCNC: 140 MMOL/L (ref 136–145)
WBC # BLD AUTO: 6.3 K/UL (ref 4.6–13.2)

## 2020-08-20 PROCEDURE — 80048 BASIC METABOLIC PNL TOTAL CA: CPT

## 2020-08-20 PROCEDURE — 36415 COLL VENOUS BLD VENIPUNCTURE: CPT

## 2020-08-20 PROCEDURE — 83735 ASSAY OF MAGNESIUM: CPT

## 2020-08-20 PROCEDURE — 74011000250 HC RX REV CODE- 250: Performed by: INTERNAL MEDICINE

## 2020-08-20 PROCEDURE — 84145 PROCALCITONIN (PCT): CPT

## 2020-08-20 PROCEDURE — 65660000000 HC RM CCU STEPDOWN

## 2020-08-20 PROCEDURE — 74011250637 HC RX REV CODE- 250/637: Performed by: INTERNAL MEDICINE

## 2020-08-20 PROCEDURE — 85027 COMPLETE CBC AUTOMATED: CPT

## 2020-08-20 PROCEDURE — 74011250636 HC RX REV CODE- 250/636: Performed by: INTERNAL MEDICINE

## 2020-08-20 RX ORDER — SODIUM CHLORIDE 9 MG/ML
250 INJECTION, SOLUTION INTRAVENOUS ONCE
Status: DISCONTINUED | OUTPATIENT
Start: 2020-08-20 | End: 2020-08-20

## 2020-08-20 RX ADMIN — ENOXAPARIN SODIUM 40 MG: 40 INJECTION SUBCUTANEOUS at 10:20

## 2020-08-20 RX ADMIN — FUROSEMIDE 20 MG: 20 TABLET ORAL at 10:20

## 2020-08-20 RX ADMIN — LISINOPRIL 2.5 MG: 5 TABLET ORAL at 10:20

## 2020-08-20 RX ADMIN — FAMOTIDINE 20 MG: 20 TABLET, FILM COATED ORAL at 18:22

## 2020-08-20 RX ADMIN — Medication 10 ML: at 14:00

## 2020-08-20 RX ADMIN — Medication 10 ML: at 23:42

## 2020-08-20 RX ADMIN — CARVEDILOL 3.12 MG: 6.25 TABLET, FILM COATED ORAL at 18:22

## 2020-08-20 RX ADMIN — SPIRONOLACTONE 25 MG: 25 TABLET ORAL at 10:19

## 2020-08-20 RX ADMIN — AZITHROMYCIN MONOHYDRATE 500 MG: 500 INJECTION, POWDER, LYOPHILIZED, FOR SOLUTION INTRAVENOUS at 10:25

## 2020-08-20 RX ADMIN — FAMOTIDINE 20 MG: 20 TABLET, FILM COATED ORAL at 10:19

## 2020-08-20 RX ADMIN — CARVEDILOL 3.12 MG: 6.25 TABLET, FILM COATED ORAL at 10:19

## 2020-08-20 RX ADMIN — ASPIRIN 81 MG: 81 TABLET, COATED ORAL at 10:20

## 2020-08-20 RX ADMIN — CEFTRIAXONE SODIUM 1 G: 1 INJECTION, POWDER, FOR SOLUTION INTRAMUSCULAR; INTRAVENOUS at 10:20

## 2020-08-20 NOTE — PROGRESS NOTES
Hospitalist Progress Note    Patient: Adrian Jolly Age: 71 y.o. : 1955 MR#: [id number] SSN: [id number]  Date/Time: 2020 9:28 AM    DOA: 2020  PCP: None    Subjective:     Feels overall better but worried if her heart failure will improved later on. Echo with EF<15 %  Cardiology to have cardiac cath tomorrow. She will need LifeVest at discharge   No leukocytosis   Low proCalc. No fever. Telemetry with tachyarhythmia      NEG COVID-19    Interval Hospital Course:  71 y.o. female with medical co-morbidities including hypothyroid and morbid obesity who presented with acute shortness of breath while walking her dog. She had associated with chest pressure but no chest palpitation. No fever, no cough. She denied recent sick contact. She was recently with symptom of renal stone but otherwise has been health. She denied leg swelling in the last days. In the ER, she was found tachycardic and had elevated troponin, CXR with pulmonary edema, CTA chest was without PE. She was given IV lasix and cardiology was consulted from ER. COVID-19 was suspected and sent. She has been awaiting for room at [facility] ER for the last days. She came to [facility] this AM for admission     ROS: No current fever/chills, no headache, no dizziness, no facial pain, no sinus congestion,   No swallowing pain, No chest pain, no palpitation, + shortness of breath, no abd pain,  No diarrhea, no urinary complaint, no leg pain or swelling    Assessment/Plan:     1. Acute systolic CHF (congestive heart failure), decompensated, improved in proBNP  2   demand ischemia, possible prior MI,        -cardia cath tomorrow for better pathology   3. Sinus tachycardia, resolved   4. Bilateral groundglass opacities on CTA chest, Negative COVID-19 infection,        -possible CAP vs heart hear failure   5. Hypothyroidism   6. Hyperglycemia, resolved   7. Morbid obesity       Can stop IV antibiotics after today. Monitor closely.  Repeat CXR after tomorrow   NPO for cardiac cath tomorrow   Continue lasix, carvedilol, lisinopril. Will need LifeVest at discharge   Monitor on telemetry. Fluid restriction, low salt diet   Continue with today antibiotic, ICS   pepcid   Transfer to non-covid section   PT/OT appreciated. DNR    Additional Notes:    Time spent >30 minutes    Case discussed with:  [x]Patient  []Family  [x]Nursing  [x]Case Management  DVT Prophylaxis:  [x]Lovenox  []Hep SQ  []SCDs  []Coumadin   []On Heparin gtt    Signed By: Manolo Farris MD     2020 9:28 AM              Objective:   VS:   Visit Vitals  BP 94/61   Pulse 79   Temp (!) 96.5 °F (35.8 °C)   Resp 18   Ht 5' 2\" (1.575 m)   Wt 106.7 kg (235 lb 3.7 oz)   SpO2 94%   BMI 43.02 kg/m²      Tmax/24hrs: Temp (24hrs), Av.4 °F (36.3 °C), Min:96.5 °F (35.8 °C), Max:98.1 °F (36.7 °C)      Intake/Output Summary (Last 24 hours) at 2020 0928  Last data filed at 2020 1818  Gross per 24 hour   Intake 760 ml   Output 2050 ml   Net -1290 ml       Tele: sinus  General:  Cooperative, Not in acute distress, speaks in full sentence while in bed  HEENT: PERRL, EOMI, supple neck, no JVD, dry oral mucosa  Cardiovascular: S1S2 regular, no rub/gallop   Pulmonary: Clear air entry bilaterally, no wheezing, ++ crackle  GI:  Soft, non tender, non distended, +bs, no guarding   Extremities:  No pedal edema, +distal pulses appreciated   Neuro: AOx3, moving all extremities, no gross deficit.      Additional:       Current Facility-Administered Medications   Medication Dose Route Frequency    spironolactone (ALDACTONE) tablet 25 mg  25 mg Oral DAILY    furosemide (LASIX) tablet 20 mg  20 mg Oral DAILY    sodium chloride (NS) flush 5-40 mL  5-40 mL IntraVENous Q8H    sodium chloride (NS) flush 5-40 mL  5-40 mL IntraVENous PRN    acetaminophen (TYLENOL) tablet 650 mg  650 mg Oral Q6H PRN    Or    acetaminophen (TYLENOL) suppository 650 mg  650 mg Rectal Q6H PRN    polyethylene glycol (MIRALAX) packet 17 g  17 g Oral DAILY PRN    promethazine (PHENERGAN) tablet 12.5 mg  12.5 mg Oral Q6H PRN    Or    ondansetron (ZOFRAN) injection 4 mg  4 mg IntraVENous Q6H PRN    famotidine (PEPCID) tablet 20 mg  20 mg Oral BID    aspirin delayed-release tablet 81 mg  81 mg Oral DAILY    azithromycin (ZITHROMAX) 500 mg in  mL  500 mg IntraVENous Q24H    cefTRIAXone (ROCEPHIN) 1 g in sterile water (preservative free) 10 mL IV syringe  1 g IntraVENous Q24H    enoxaparin (LOVENOX) injection 40 mg  40 mg SubCUTAneous Q24H    carvediloL (COREG) tablet 3.125 mg  3.125 mg Oral BID WITH MEALS    lisinopriL (PRINIVIL, ZESTRIL) tablet 2.5 mg  2.5 mg Oral DAILY            Lab/Data Review:  Labs: Results:       Chemistry Recent Labs     08/20/20  0137 08/19/20  0215 08/18/20  1108   GLU 92 85 105*    140 143   K 3.7 3.9 3.6    110 111   CO2 26 22 25   BUN 20* 19* 15   CREA 0.69 0.76 0.63   BUCR 29* 25* 24*   AGAP 5 8 7   CA 9.0 9.0 8.5     Recent Labs     08/18/20  1108   ALT 78*   TP 6.5   ALB 3.4   GLOB 3.1   AGRAT 1.1      CBC w/Diff Recent Labs     08/20/20  0137 08/19/20  0215 08/18/20  1108   WBC 6.3 5.9 5.2   RBC 4.97 4.90 4.90   HGB 13.8 13.6 13.6   HCT 42.6 41.6 41.4   MCV 85.7 84.9 84.5   MCH 27.8 27.8 27.8   MCHC 32.4 32.7 32.9   RDW 16.2* 16.2* 16.0*    183 177   GRANS  --   --  64   LYMPH  --   --  23   EOS  --   --  2      Coagulation No results for input(s): PTP, INR, APTT, INREXT, INREXT in the last 72 hours.     Iron/Ferritin Lab Results   Component Value Date/Time    Ferritin 26 08/19/2020 02:15 AM       BNP    Cardiac Enzymes Lab Results   Component Value Date/Time    CK 75 08/18/2020 11:08 AM    CK - MB 2.3 08/18/2020 11:08 AM    CK-MB Index 3.1 08/18/2020 11:08 AM    Troponin-I, QT 0.05 (H) 08/18/2020 11:08 AM        Lactic Acid    Thyroid Studies          All Micro Results     Procedure Component Value Units Date/Time    CULTURE, BLOOD [[id number]] Collected:  08/18/20 1108 Order Status:  Completed Specimen:  Blood Updated:  08/20/20 0736     Special Requests: NO SPECIAL REQUESTS        Culture result: NO GROWTH 2 DAYS       CULTURE, BLOOD [[id number]] Collected:  08/18/20 1118    Order Status:  Completed Specimen:  Blood Updated:  08/20/20 0736     Special Requests: NO SPECIAL REQUESTS        Culture result: NO GROWTH 2 DAYS       Caye Mad, URINE [[id number]] Collected:  08/18/20 1510    Order Status:  Completed Specimen:  Urine, random Updated:  08/18/20 2008     Strep pneumo Ag, urine Negative       LEGIONELLA PNEUMOPHILA AG, URINE [[id number]] Collected:  08/18/20 1510    Order Status:  Completed Specimen:  Urine, random Updated:  08/18/20 2008     Legionella Ag, urine Negative       CULTURE, RESPIRATORY/SPUTUM/BRONCH Viveca Littler [[id number]]     Order Status:  Sent Specimen:  Sputum             Images:    CT (Most Recent). CT Results (most recent):  Results from Hospital Encounter encounter on 08/16/20   CTA CHEST W OR W WO CONT    Narrative EXAM: CTA CHEST W OR W WO CONT    CLINICAL INDICATION/HISTORY : dyspnea, elevated d-dimer, CHF, eval for PE.    COMPARISON: Chest x-ray same day    TECHNIQUE: Thin section axial images were obtained from the thoracic inlet  through the upper abdomen after the uneventful administration of IV contrast.   Utilization of smart prep dynamic bolus enhancement, timing centered for  pulmonary artery delineation. Coronal and sagittal maximum intensity projection  (MIP) reconstructions were post-processed and utilized to better define  pulmonary artery anatomy and increase sensitivity for detecting emboli.  80 cc   Isovue-370 IV    All CT scans at this facility are performed using dose optimization of technique  as appropriate to a performed exam, to include automated exposure control,  adjustment of the mA and/or kV according to patient size (including appropriate  matching for site specific examinations), or use of iterative reconstruction  technique. FINDINGS:    Lungs: Diffuse interstitial thickening. Patchy groundglass opacities in all  lobes. Thyroid and chest wall: Unremarkable    Heart: No cardiomegaly or pericardial effusion. Aorta: Unremarkable for age    Pleural spaces: Small right pleural effusion. Trace left pleural effusion    Pulmonary Arteries: Diagnostic evaluation of the pulmonary arteries. No  intraluminal filling defect to suggest acute pulmonary embolism. Adenopathy: None. Upper abdomen: Large hiatal hernia    Bones: Unremarkable for age      Impression IMPRESSION:    1. No evidence for pulmonary embolism. .  2.  Mild diffuse interstitial prominence, likely edema. 3.  Patchy subtle groundglass opacities throughout the lungs bilaterally which  are nonspecific but could be due to edema or infectious or inflammatory process. Findings could be seen in covid pneumonia although are not typical appearance. Constellation of findings favors CHF. 4.  Small right and trace left pleural effusion. 5.  Large hiatal hernia            XRAY (Most Recent) XR Results (most recent):  Results from Hospital Encounter encounter on 08/16/20   XR CHEST PA LAT    Narrative EXAMINATION: Chest 2 views    INDICATION: Pneumonia    COMPARISON: 8/17/2020    FINDINGS: Frontal and lateral views of the chest obtained. Borderline prominent  cardiac silhouette. Minimal aortic arch calcifications. Slightly prominent  perihilar interstitium. Retrocardiac opacity with air-fluid level consistent  with hiatal hernia. No evidence of pneumothorax. No acute osseous findings. Impression IMPRESSION:    Borderline prominent cardiac silhouette with mild perihilar interstitial  infiltrate/edema. Hiatal hernia. EKG No results found for this or any previous visit. 2D ECHO 08/16/20   ECHO ADULT FOLLOW-UP OR LIMITED 08/18/2020 8/18/2020    Narrative · LV: Estimated LVEF is <15%. Visually measured ejection fraction.  Normal   wall thickness. Moderately dilated left ventricle. Severely reduced   systolic function. · TV: Mild tricuspid valve regurgitation is present. · MV: Mild to moderate mitral valve regurgitation is present. · AV: Mild aortic valve regurgitation is present. · PA: Mild pulmonary hypertension. Pulmonary arterial systolic pressure is   41 mmHg. · COVID r/o  · RV: Mildly dilated right ventricle.         Signed by: Chika Garcia MD

## 2020-08-20 NOTE — PROGRESS NOTES
[facility]      CARDIOLOGY PROGRESS NOTE  RECS:      1. Acute congestive heart failure-systolic dysfunction: Resolved clinically. Continue  Lasix and Aldactone. Nuclear stress test canceled per Dr. Amarilis Drew. Patient schedule for cardiac catheterization tomorrow, discussed risk and benefits. Patient wants to proceed   2. Cardiomyopathy with EF <15 % - unclear etiology. LHC tomorrow   3. Hypertension- labile continue with Coreg and ACEi. Would consider Entresto after diuresis. 4. Suspected COV-19   5. SOB- in the setting #1 and #6  Better   6. Morbid obesity- weight los strongly advised   7. LBBB- unknown onset  8. MR- mild to moderate on Recent Echo     Patient with severe LV dysfunction and acute systolic CHF- will proceed with [facility] to r/o CAD.     EKG Results     Procedure 720 Value Units Date/Time    EKG, 12 LEAD, SUBSEQUENT [[id number]] Collected:  08/18/20 0852    Order Status:  Completed Updated:  08/18/20 1017     Ventricular Rate 104 BPM      Atrial Rate 104 BPM      P-R Interval 136 ms      QRS Duration 154 ms      Q-T Interval 398 ms      QTC Calculation (Bezet) 523 ms      Calculated P Axis 37 degrees      Calculated R Axis -24 degrees      Calculated T Axis 41 degrees      Diagnosis --     Sinus tachycardia with occasional premature ventricular complexes  Left bundle branch block  Abnormal ECG  When compared with ECG of 17-AUG-2020 07:01,  premature ventricular complexes are now present  Left bundle branch block has replaced Nonspecific intraventricular block  Criteria for Lateral infarct are no longer present  Confirmed by Dawit Camacho ([id number]) on 8/18/2020 10:17:25 AM      EKG, 12 LEAD, INITIAL [[id number]] Collected:  08/17/20 0701    Order Status:  Completed Updated:  08/17/20 1948     Ventricular Rate 90 BPM      Atrial Rate 90 BPM      P-R Interval 152 ms      QRS Duration 156 ms      Q-T Interval 420 ms      QTC Calculation (Bezet) 513 ms      Calculated P Axis 51 degrees      Calculated R Axis -62 degrees      Calculated T Axis 65 degrees      Diagnosis --     Normal sinus rhythm  Possible Left atrial enlargement  Left axis deviation  Nonspecific intraventricular block  Lateral infarct , age undetermined  Abnormal ECG  When compared with ECG of 16-AUG-2020 10:11,  premature atrial complexes are no longer present  Nonspecific intraventricular block has replaced Left bundle branch block  Lateral infarct is now present  Confirmed by Linda Avila [id number]) on 8/17/2020 7:48:20 PM      EKG, 12 LEAD, INITIAL [[id number]] Collected:  08/16/20 1011    Order Status:  Completed Updated:  08/16/20 1625     Ventricular Rate 126 BPM      Atrial Rate 126 BPM      P-R Interval 130 ms      QRS Duration 148 ms      Q-T Interval 356 ms      QTC Calculation (Bezet) 515 ms      Calculated P Axis -2 degrees      Calculated R Axis -49 degrees      Calculated T Axis 82 degrees      Diagnosis --     Sinus tachycardia with premature atrial complexes  Left axis deviation  Left bundle branch block  Abnormal ECG  No previous ECGs available  Confirmed by Aliyah Leach ([id number]) on 8/16/2020 4:25:04 PM          XR Results (most recent):  Results from [place] encounter on 08/16/20   XR CHEST PA LAT    Narrative EXAMINATION: Chest 2 views    INDICATION: Pneumonia    COMPARISON: 8/17/2020    FINDINGS: Frontal and lateral views of the chest obtained. Borderline prominent  cardiac silhouette. Minimal aortic arch calcifications. Slightly prominent  perihilar interstitium. Retrocardiac opacity with air-fluid level consistent  with hiatal hernia. No evidence of pneumothorax. No acute osseous findings. Impression IMPRESSION:    Borderline prominent cardiac silhouette with mild perihilar interstitial  infiltrate/edema. Hiatal hernia.       08/16/20   ECHO ADULT FOLLOW-UP OR LIMITED 08/18/2020 8/18/2020    Narrative · LV: Estimated LVEF is <15%. Visually measured ejection fraction.  Normal   wall thickness. Moderately dilated left ventricle. Severely reduced   systolic function. · TV: Mild tricuspid valve regurgitation is present. · MV: Mild to moderate mitral valve regurgitation is present. · AV: Mild aortic valve regurgitation is present. · PA: Mild pulmonary hypertension. Pulmonary arterial systolic pressure is   41 mmHg. · COVID r/o  · RV: Mildly dilated right ventricle. Signed by: Elizabeth Alarcon MD                 ASSESSMENT:  Hospital Problems  Never Reviewed          Codes Class Noted POA    Cardiomyopathy [facility]) ICD-10-CM: I42.9  ICD-9-CM: 425.4  8/18/2020 Unknown        Suspected COVID-19 virus infection ICD-10-CM: Z20.828  ICD-9-CM: V01.79  8/18/2020 Unknown        CAP (community acquired pneumonia) ICD-10-CM: J18.9  ICD-9-CM: 486  8/18/2020 Unknown        Elevated troponin ICD-10-CM: R79.89  ICD-9-CM: 790.6  8/18/2020 Unknown        Acute CHF (congestive heart failure) ([facility] ICD-10-CM: I50.9  ICD-9-CM: 428.0  8/16/2020 Unknown                SUBJECTIVE:  No chest pain  Improved shortness of breath    OBJECTIVE:    VS:   Visit Vitals  BP 94/61   Pulse 79   Temp (!) 96.5 °F (35.8 °C)   Resp 18   Ht 5' 2\" (1.575 m)   Wt 106.7 kg (235 lb 3.7 oz)   SpO2 94%   BMI 43.02 kg/m²         Intake/Output Summary (Last 24 hours) at 8/20/2020 1046  Last data filed at 8/19/2020 1818  Gross per 24 hour   Intake 760 ml   Output 2050 ml   Net -1290 ml     TELE: normal sinus rhythm    General: alert and in no apparent distress  HENT: Normocephalic, atraumatic. Normal external eye.   Neck :  no bruit, no JVD, JVD difficult to assess due to obesity  Cardiac:  regular rate and rhythm, S1, S2 normal, no murmur, click, rub or gallop  Chest/Lungs:chest clear, no wheezing, rales, normal symmetric air entry  Abdomen: Soft, nontender, no masses  Extremities:  No c/c/edema, peripheral pulses present      Labs: Results:       Chemistry Recent Labs     08/20/20  0137 08/19/20  0215 08/18/20  1108   GLU 92 85 105*  140 143   K 3.7 3.9 3.6    110 111   CO2 26 22 25   BUN 20* 19* 15   CREA 0.69 0.76 0.63   CA 9.0 9.0 8.5   MG 2.1  --  2.0   AGAP 5 8 7   BUCR 29* 25* 24*   AP  --   --  63   TP  --   --  6.5   ALB  --   --  3.4   GLOB  --   --  3.1   AGRAT  --   --  1.1      CBC w/Diff Recent Labs     08/20/20  0137 08/19/20  0215 08/18/20  1108   WBC 6.3 5.9 5.2   RBC 4.97 4.90 4.90   HGB 13.8 13.6 13.6   HCT 42.6 41.6 41.4    183 177   GRANS  --   --  64   LYMPH  --   --  23   EOS  --   --  2      Cardiac Enzymes Recent Labs     08/18/20  1108   CPK 75   CKND1 3.1      Coagulation No results for input(s): PTP, INR, APTT, INREXT, INREXT in the last 72 hours. Lipid Panel Lab Results   Component Value Date/Time    Cholesterol, total 142 08/18/2020 11:08 AM    HDL Cholesterol 52 08/18/2020 11:08 AM    LDL, calculated 75 08/18/2020 11:08 AM    VLDL, calculated 15 08/18/2020 11:08 AM    Triglyceride 75 08/18/2020 11:08 AM    CHOL/HDL Ratio 2.7 08/18/2020 11:08 AM      BNP No results for input(s): BNPP in the last 72 hours. Liver Enzymes Recent Labs     08/18/20  1108   TP 6.5   ALB 3.4   AP 63      Digoxin    Thyroid Studies Lab Results   Component Value Date/Time    TSH 2.50 08/16/2020 02:50 PM              Mariam John NP TeresaC  Supervised    I have independently evaluated and examined the patient. All relevant labs and testing data's are reviewed. Care plan discussed and updated after review.     Lynne Jefferson MD

## 2020-08-21 VITALS
HEIGHT: 62 IN | TEMPERATURE: 97.9 F | DIASTOLIC BLOOD PRESSURE: 66 MMHG | HEART RATE: 78 BPM | SYSTOLIC BLOOD PRESSURE: 93 MMHG | WEIGHT: 235.23 LBS | BODY MASS INDEX: 43.29 KG/M2 | OXYGEN SATURATION: 96 % | RESPIRATION RATE: 20 BRPM

## 2020-08-21 LAB
ANION GAP SERPL CALC-SCNC: 5 MMOL/L (ref 3–18)
BUN SERPL-MCNC: 21 MG/DL (ref 7–18)
BUN/CREAT SERPL: 28 (ref 12–20)
CALCIUM SERPL-MCNC: 8.8 MG/DL (ref 8.5–10.1)
CHLORIDE SERPL-SCNC: 106 MMOL/L (ref 100–111)
CO2 SERPL-SCNC: 28 MMOL/L (ref 21–32)
CREAT SERPL-MCNC: 0.76 MG/DL (ref 0.6–1.3)
ERYTHROCYTE [DISTWIDTH] IN BLOOD BY AUTOMATED COUNT: 16 % (ref 11.6–14.5)
GLUCOSE SERPL-MCNC: 86 MG/DL (ref 74–99)
HCT VFR BLD AUTO: 43.1 % (ref 35–45)
HGB BLD-MCNC: 13.7 G/DL (ref 12–16)
MCH RBC QN AUTO: 27.5 PG (ref 24–34)
MCHC RBC AUTO-ENTMCNC: 31.8 G/DL (ref 31–37)
MCV RBC AUTO: 86.5 FL (ref 74–97)
PLATELET # BLD AUTO: 191 K/UL (ref 135–420)
PMV BLD AUTO: 10.8 FL (ref 9.2–11.8)
POTASSIUM SERPL-SCNC: 4 MMOL/L (ref 3.5–5.5)
RBC # BLD AUTO: 4.98 M/UL (ref 4.2–5.3)
SODIUM SERPL-SCNC: 139 MMOL/L (ref 136–145)
WBC # BLD AUTO: 6.2 K/UL (ref 4.6–13.2)

## 2020-08-21 PROCEDURE — 4A023N7 MEASUREMENT OF CARDIAC SAMPLING AND PRESSURE, LEFT HEART, PERCUTANEOUS APPROACH: ICD-10-PCS | Performed by: INTERNAL MEDICINE

## 2020-08-21 PROCEDURE — 77030016699 HC CATH ANGI DX INFN1 CARD -A: Performed by: INTERNAL MEDICINE

## 2020-08-21 PROCEDURE — 36415 COLL VENOUS BLD VENIPUNCTURE: CPT

## 2020-08-21 PROCEDURE — 80048 BASIC METABOLIC PNL TOTAL CA: CPT

## 2020-08-21 PROCEDURE — 74011250637 HC RX REV CODE- 250/637: Performed by: INTERNAL MEDICINE

## 2020-08-21 PROCEDURE — 77030013744: Performed by: INTERNAL MEDICINE

## 2020-08-21 PROCEDURE — 74011250636 HC RX REV CODE- 250/636: Performed by: INTERNAL MEDICINE

## 2020-08-21 PROCEDURE — 77030015766: Performed by: INTERNAL MEDICINE

## 2020-08-21 PROCEDURE — 77030019569 HC BND COMPR RAD TERU -B: Performed by: INTERNAL MEDICINE

## 2020-08-21 PROCEDURE — C1769 GUIDE WIRE: HCPCS | Performed by: INTERNAL MEDICINE

## 2020-08-21 PROCEDURE — 99153 MOD SED SAME PHYS/QHP EA: CPT | Performed by: INTERNAL MEDICINE

## 2020-08-21 PROCEDURE — 99152 MOD SED SAME PHYS/QHP 5/>YRS: CPT | Performed by: INTERNAL MEDICINE

## 2020-08-21 PROCEDURE — 74011000250 HC RX REV CODE- 250: Performed by: INTERNAL MEDICINE

## 2020-08-21 PROCEDURE — 77030013797 HC KT TRNSDUC PRSSR EDWD -A: Performed by: INTERNAL MEDICINE

## 2020-08-21 PROCEDURE — 85027 COMPLETE CBC AUTOMATED: CPT

## 2020-08-21 PROCEDURE — 93458 L HRT ARTERY/VENTRICLE ANGIO: CPT | Performed by: INTERNAL MEDICINE

## 2020-08-21 PROCEDURE — 74011000636 HC RX REV CODE- 636: Performed by: INTERNAL MEDICINE

## 2020-08-21 RX ORDER — ATORVASTATIN CALCIUM 20 MG/1
20 TABLET, FILM COATED ORAL
Qty: 30 TAB | Refills: 2 | Status: SHIPPED | OUTPATIENT
Start: 2020-08-21 | End: 2021-10-19 | Stop reason: ALTCHOICE

## 2020-08-21 RX ORDER — LISINOPRIL 2.5 MG/1
2.5 TABLET ORAL
Qty: 30 TAB | Refills: 1 | Status: SHIPPED | OUTPATIENT
Start: 2020-08-21 | End: 2020-09-15 | Stop reason: ALTCHOICE

## 2020-08-21 RX ORDER — FENTANYL CITRATE 50 UG/ML
INJECTION, SOLUTION INTRAMUSCULAR; INTRAVENOUS AS NEEDED
Status: DISCONTINUED | OUTPATIENT
Start: 2020-08-21 | End: 2020-08-21 | Stop reason: HOSPADM

## 2020-08-21 RX ORDER — SODIUM CHLORIDE 0.9 % (FLUSH) 0.9 %
5-40 SYRINGE (ML) INJECTION AS NEEDED
Status: DISCONTINUED | OUTPATIENT
Start: 2020-08-21 | End: 2020-08-22 | Stop reason: HOSPADM

## 2020-08-21 RX ORDER — ASPIRIN 81 MG/1
81 TABLET ORAL DAILY
Qty: 90 TAB | Refills: 1 | Status: SHIPPED | OUTPATIENT
Start: 2020-08-21

## 2020-08-21 RX ORDER — SODIUM CHLORIDE 0.9 % (FLUSH) 0.9 %
5-40 SYRINGE (ML) INJECTION EVERY 8 HOURS
Status: DISCONTINUED | OUTPATIENT
Start: 2020-08-21 | End: 2020-08-22 | Stop reason: HOSPADM

## 2020-08-21 RX ORDER — LIDOCAINE HYDROCHLORIDE 10 MG/ML
INJECTION, SOLUTION EPIDURAL; INFILTRATION; INTRACAUDAL; PERINEURAL AS NEEDED
Status: DISCONTINUED | OUTPATIENT
Start: 2020-08-21 | End: 2020-08-21 | Stop reason: HOSPADM

## 2020-08-21 RX ORDER — CARVEDILOL 3.12 MG/1
3.12 TABLET ORAL 2 TIMES DAILY WITH MEALS
Qty: 60 TAB | Refills: 3 | Status: SHIPPED | OUTPATIENT
Start: 2020-08-21 | End: 2020-10-29 | Stop reason: SDUPTHER

## 2020-08-21 RX ORDER — MIDAZOLAM HYDROCHLORIDE 1 MG/ML
INJECTION, SOLUTION INTRAMUSCULAR; INTRAVENOUS AS NEEDED
Status: DISCONTINUED | OUTPATIENT
Start: 2020-08-21 | End: 2020-08-21 | Stop reason: HOSPADM

## 2020-08-21 RX ORDER — GUAIFENESIN 100 MG/5ML
LIQUID (ML) ORAL AS NEEDED
Status: DISCONTINUED | OUTPATIENT
Start: 2020-08-21 | End: 2020-08-21 | Stop reason: HOSPADM

## 2020-08-21 RX ORDER — HEPARIN SODIUM 1000 [USP'U]/ML
INJECTION, SOLUTION INTRAVENOUS; SUBCUTANEOUS AS NEEDED
Status: DISCONTINUED | OUTPATIENT
Start: 2020-08-21 | End: 2020-08-21 | Stop reason: HOSPADM

## 2020-08-21 RX ORDER — FUROSEMIDE 20 MG/1
20 TABLET ORAL DAILY
Qty: 30 TAB | Refills: 2 | Status: SHIPPED | OUTPATIENT
Start: 2020-08-21 | End: 2021-10-19

## 2020-08-21 RX ORDER — SPIRONOLACTONE 25 MG/1
25 TABLET ORAL DAILY
Qty: 30 TAB | Refills: 3 | Status: SHIPPED | OUTPATIENT
Start: 2020-08-21 | End: 2021-10-19 | Stop reason: ALTCHOICE

## 2020-08-21 RX ORDER — VERAPAMIL HYDROCHLORIDE 2.5 MG/ML
INJECTION, SOLUTION INTRAVENOUS AS NEEDED
Status: DISCONTINUED | OUTPATIENT
Start: 2020-08-21 | End: 2020-08-21 | Stop reason: HOSPADM

## 2020-08-21 RX ADMIN — FAMOTIDINE 20 MG: 20 TABLET, FILM COATED ORAL at 18:48

## 2020-08-21 RX ADMIN — CARVEDILOL 3.12 MG: 6.25 TABLET, FILM COATED ORAL at 18:49

## 2020-08-21 RX ADMIN — ASPIRIN 81 MG: 81 TABLET, COATED ORAL at 18:48

## 2020-08-21 RX ADMIN — ENOXAPARIN SODIUM 40 MG: 40 INJECTION SUBCUTANEOUS at 18:51

## 2020-08-21 RX ADMIN — SPIRONOLACTONE 25 MG: 25 TABLET ORAL at 18:48

## 2020-08-21 RX ADMIN — LISINOPRIL 2.5 MG: 5 TABLET ORAL at 18:49

## 2020-08-21 RX ADMIN — FUROSEMIDE 20 MG: 20 TABLET ORAL at 18:48

## 2020-08-21 RX ADMIN — Medication 10 ML: at 18:09

## 2020-08-21 NOTE — PROGRESS NOTES
TRANSFER - IN REPORT:    Verbal report received from Amanda(name) on [facility]  being received from Cath lab(unit) for routine post - op      Report consisted of patients Situation, Background, Assessment and   Recommendations(SBAR). Information from the following report(s) SBAR, Procedure Summary and MAR was reviewed with the receiving nurse. Opportunity for questions and clarification was provided. Assessment completed upon patients arrival to unit and care assumed. Pt has TR band to right wrist, no bleeding or hematoma noted.

## 2020-08-21 NOTE — DISCHARGE SUMMARY
Discharge Summary    Patient: Fiordaliza Nassar               Sex: female          DOA: 8/16/2020         YOB: 1955      Age:  71 y.o.        LOS:  LOS: 5 days                Admit Date: 8/16/2020    Discharge Date: 8/21/2020    Primary care physician: None    Discharge Diagnoses:    1. Acute systolic CHF (congestive heart failure), decompensated, improved in proBNP  2   Non-obstructive CAD with demand ischemia on presentation, no MI   3. Sinus tachycardia, resolved   4. Bilateral groundglass opacities on CTA chest, Negative COVID-19 infection,        -possible CAP vs heart hear failure   5. Hypothyroidism   6. Hyperglycemia, resolved   7. Morbid obesity   8. Large hiatal hernia on CT    Discharge Condition: Good  Disposition: home with home health  Code Status: DNR/DNI    Follow up for Primary Care Physician:  1) she needs close follow up with her cardiology team for further optimal medical management   2) she needs to wear her LifeVest until her repeat Echo with Cardiology   3) please monitor and symptomatic treatment for her large hiatal hernia     Hospital Course:   71 y.o. female with medical co-morbidities including hypothyroid and morbid obesity who presented with acute shortness of breath while walking her dog. She had associated with chest pressure but no chest palpitation. No fever, no cough. She denied recent sick contact. She was recently with symptom of renal stone but otherwise has been health. She denied leg swelling in the last days. In the ER, she was found tachycardic and had elevated troponin, CXR with pulmonary edema, CTA chest was without PE. She was given IV lasix and cardiology was consulted from ER. COVID-19 was suspected and sent. She has been awaiting for room at [facility] ER for the last days. She came to [facility] this AM for admission. She has improvement of her shortness of breath with lasix and antibiotics for suspected PNA. Her echo showed significant EF decrease.  Her COVID-19 test was negative. Her repeat CXR without clear evidence of PNA, hence her antibiotics were stopped. Cardiology consulted and initially planned for stress test but she was given cardiac cath instead. Cardiac cath this afternoon:  Conclusion   Non obstructive epicardial coronary artery disease with known severe LV dysfunction. Continue intense medical management. Will obtain lifevest as outpatient. If EF remains low inspite of optimal therapy, may need ICD. Last 24 Hours:  Patient was seen this AM, feels good overall. Shortness of breath has significantly improved. No overnight event. Has finished her antibiotics with low risk for PNA. No fever. No leukocytosis. No growth on blood culture  She awaited for cardiac cath today. Echo with EF<15 %  No leukocytosis   Low proCalc. No fever.    Telemetry with tachyarhythmia    NEG COVID-19    ROS:  No current fever/chills, no headache, no dizziness, no facial pain, no sinus congestion,   No swallowing pain, No chest pain, no palpitation, no shortness of breath, no abd pain,  No diarrhea, no urinary complaint, no leg pain or swelling    VS:   Visit Vitals  BP 93/66 (BP 1 Location: Left arm, BP Patient Position: At rest)   Pulse 78   Temp 97.9 °F (36.6 °C)   Resp 20   Ht 5' 2\" (1.575 m)   Wt 106.7 kg (235 lb 3.7 oz)   SpO2 96%   BMI 43.02 kg/m²      Tmax/24hrs: Temp (24hrs), Av.9 °F (36.6 °C), Min:97.8 °F (36.6 °C), Max:98 °F (36.7 °C)      Intake/Output Summary (Last 24 hours) at 2020 1801  Last data filed at 2020 1057  Gross per 24 hour   Intake 0 ml   Output    Net 0 ml       Tele: sinus  General:  Cooperative, Not in acute distress, speaks in full sentence while in bed  HEENT: PERRL, EOMI, supple neck, no JVD, dry oral mucosa  Cardiovascular: S1S2 regular, no rub/gallop   Pulmonary: air entry bilaterally, no wheezing, +improved crackle  GI:  Soft, non tender, non distended, +bs, no guarding   Extremities:  No pedal edema, +distal pulses appreciated   Neuro: AOx3, moving all extremities, no gross deficit. Consults:   Cardiology: Dr. Tricia Chan, Dr. Elisa Callahan    Significant Diagnostic Studies:   XR Results (most recent):  Results from Hospital Encounter encounter on 08/16/20   XR CHEST PA LAT    Narrative EXAMINATION: Chest 2 views    INDICATION: Pneumonia    COMPARISON: 8/17/2020    FINDINGS: Frontal and lateral views of the chest obtained. Borderline prominent  cardiac silhouette. Minimal aortic arch calcifications. Slightly prominent  perihilar interstitium. Retrocardiac opacity with air-fluid level consistent  with hiatal hernia. No evidence of pneumothorax. No acute osseous findings. Impression IMPRESSION:    Borderline prominent cardiac silhouette with mild perihilar interstitial  infiltrate/edema. Hiatal hernia. CT Results (most recent):  Results from Hospital Encounter encounter on 08/16/20   CTA CHEST W OR W WO CONT    Narrative EXAM: CTA CHEST W OR W WO CONT    CLINICAL INDICATION/HISTORY : dyspnea, elevated d-dimer, CHF, eval for PE.    COMPARISON: Chest x-ray same day    TECHNIQUE: Thin section axial images were obtained from the thoracic inlet  through the upper abdomen after the uneventful administration of IV contrast.   Utilization of smart prep dynamic bolus enhancement, timing centered for  pulmonary artery delineation. Coronal and sagittal maximum intensity projection  (MIP) reconstructions were post-processed and utilized to better define  pulmonary artery anatomy and increase sensitivity for detecting emboli. 80 cc   Isovue-370 IV    All CT scans at this facility are performed using dose optimization of technique  as appropriate to a performed exam, to include automated exposure control,  adjustment of the mA and/or kV according to patient size (including appropriate  matching for site specific examinations), or use of iterative reconstruction  technique.     FINDINGS:    Lungs: Diffuse interstitial thickening. Patchy groundglass opacities in all  lobes. Thyroid and chest wall: Unremarkable    Heart: No cardiomegaly or pericardial effusion. Aorta: Unremarkable for age    Pleural spaces: Small right pleural effusion. Trace left pleural effusion    Pulmonary Arteries: Diagnostic evaluation of the pulmonary arteries. No  intraluminal filling defect to suggest acute pulmonary embolism. Adenopathy: None. Upper abdomen: Large hiatal hernia    Bones: Unremarkable for age      Impression IMPRESSION:    1. No evidence for pulmonary embolism. .  2.  Mild diffuse interstitial prominence, likely edema. 3.  Patchy subtle groundglass opacities throughout the lungs bilaterally which  are nonspecific but could be due to edema or infectious or inflammatory process. Findings could be seen in covid pneumonia although are not typical appearance. Constellation of findings favors CHF. 4.  Small right and trace left pleural effusion. 5.  Large hiatal hernia         08/16/20   ECHO ADULT FOLLOW-UP OR LIMITED 08/18/2020 8/18/2020    Narrative · LV: Estimated LVEF is <15%. Visually measured ejection fraction. Normal   wall thickness. Moderately dilated left ventricle. Severely reduced   systolic function. · TV: Mild tricuspid valve regurgitation is present. · MV: Mild to moderate mitral valve regurgitation is present. · AV: Mild aortic valve regurgitation is present. · PA: Mild pulmonary hypertension. Pulmonary arterial systolic pressure is   41 mmHg. · COVID r/o  · RV: Mildly dilated right ventricle. Signed by: Ne Breen MD       08/16/20   CARDIAC PROCEDURE 08/21/2020 8/21/2020    Narrative Non obstructive epicardial coronary artery disease with known severe LV   dysfunction. Continue intense medical management. Will obtain lifevest as outpatient. If EF remains low inspite of optimal therapy, may need ICD.      Signed by: Armida Gonzalez MD         Lab/Data Review:  Labs: Results: Chemistry Recent Labs     08/21/20  0540 08/20/20  0137 08/19/20  0215   GLU 86 92 85    140 140   K 4.0 3.7 3.9    109 110   CO2 28 26 22   BUN 21* 20* 19*   CREA 0.76 0.69 0.76   CA 8.8 9.0 9.0   AGAP 5 5 8   BUCR 28* 29* 25*      CBC w/Diff Recent Labs     08/21/20  0540 08/20/20  0137 08/19/20  0215   WBC 6.2 6.3 5.9   RBC 4.98 4.97 4.90   HGB 13.7 13.8 13.6   HCT 43.1 42.6 41.6    187 183      Coagulation No results for input(s): PTP, INR, APTT, INREXT in the last 72 hours. Iron/Ferritin No results for input(s): IRON in the last 72 hours. No lab exists for component: TIBCCALC   BNP No results for input(s): BNPP in the last 72 hours. Cardiac Enzymes No results for input(s): CPK, CKND1, YVONNE in the last 72 hours. No lab exists for component: CKRMB, TROIP   Liver Enzymes No results for input(s): TP, ALB, TBIL, AP in the last 72 hours. No lab exists for component: SGOT, GPT, DBIL   Thyroid Studies No results for input(s): T4, T3U, TSH, TSHEXT in the last 72 hours.     No lab exists for component: T3RU       All Micro Results     Procedure Component Value Units Date/Time    CULTURE, BLOOD [[id number]] Collected:  08/18/20 1108    Order Status:  Completed Specimen:  Blood Updated:  08/21/20 0847     Special Requests: NO SPECIAL REQUESTS        Culture result: NO GROWTH 3 DAYS       CULTURE, BLOOD [[id number]] Collected:  08/18/20 1118    Order Status:  Completed Specimen:  Blood Updated:  08/21/20 0847     Special Requests: NO SPECIAL REQUESTS        Culture result: NO GROWTH 3 DAYS       STREP Carly Grater, URINE [[id number]] Collected:  08/18/20 1510    Order Status:  Completed Specimen:  Urine, random Updated:  08/18/20 2008     Strep pneumo Ag, urine Negative       LEGIONELLA PNEUMOPHILA AG, URINE [[id number]] Collected:  08/18/20 1510    Order Status:  Completed Specimen:  Urine, random Updated:  08/18/20 2008     Legionella Ag, urine Negative       CULTURE, RESPIRATORY/SPUTUM/BRONCH W Claude Keepers [[id number]] Collected:  08/18/20 0845    Order Status:  Canceled Specimen:  Sputum                 Medications at discharge  including reasons for change and indications for new ones:   Current Discharge Medication List      START taking these medications    Details   aspirin delayed-release 81 mg tablet Take 1 Tab by mouth daily. Indications: treatment to prevent a heart attack  Qty: 90 Tab, Refills: 1      carvediloL (COREG) 3.125 mg tablet Take 1 Tab by mouth two (2) times daily (with meals). Indications: chronic heart failure, high blood pressure  Qty: 60 Tab, Refills: 3      furosemide (LASIX) 20 mg tablet Take 1 Tab by mouth daily. Indications: high blood pressure, fluid in the lungs due to chronic heart failure  Qty: 30 Tab, Refills: 2      lisinopriL (PRINIVIL, ZESTRIL) 2.5 mg tablet Take 1 Tab by mouth nightly. Indications: a heart attack, chronic heart failure  Qty: 30 Tab, Refills: 1      spironolactone (ALDACTONE) 25 mg tablet Take 1 Tab by mouth daily. Indications: heart failure with reduced ejection fraction, high blood pressure  Qty: 30 Tab, Refills: 3      atorvastatin (LIPITOR) 20 mg tablet Take 1 Tab by mouth nightly.  Indications: hardening of the arteries due to plaque buildup  Qty: 30 Tab, Refills: 2         CONTINUE these medications which have NOT CHANGED    Details   estradioL (VIVELLE) 0.05 mg/24 hr APPLY 1 PATCH ON THE SKIN TWICE A WEEK      progesterone (PROMETRIUM) 200 mg capsule TAKE 1 CAPSULE BY MOUTH AT BEDTIME      traZODone (DESYREL) 50 mg tablet TAKE 1 TABLET BY MOUTH ONCE DAILY AT BEDTIME      NP Thyroid 60 mg tablet TAKE 1 TABLET BY MOUTH ONCE DAILY IN THE MORNING      buPROPion XL (WELLBUTRIN XL) 150 mg tablet                      Pending laboratory work and tests: none    Activity: Activity as tolerated, fall precaution     Diet: Cardiac Diet and Low fat, Low cholesterol,   2 gram salt diet, fluid restriction to 1.5 to 2 liters daily    Wound Care: None needed        Urban Dowell MD  8/21/2020  6:01 PM

## 2020-08-21 NOTE — PROGRESS NOTES
Comprehensive Nutrition Assessment    Type and Reason for Visit: Initial, RD nutrition re-screen/LOS    Nutrition Recommendations/Plan:  - Monitor readiness to resume diet following procedure. - Diet education & outpatient RD contact information provided today. Nutrition Assessment:  NPO for cardiac cath today. Reports good appetite and meal intake, likes the food. Diet education provided today, pt receptive and appears ready to make dieteray changes. Malnutrition Assessment:  Malnutrition Status:  No malnutrition      Nutrition History and Allergies: Presented to ED with acute SOB while walking her dog. Acute CHF with EF <15%. NKFA. Reports unhealthy dietary choices & undesireable food choices PTA. Estimated Daily Nutrient Needs:  Energy (kcal):  0160-2002  Protein (g):         Fluid (ml/day):  5662-4955    Nutrition Related Findings:  BM 8/18. Lasix and aldactone      Wounds:    None       Current Nutrition Therapies:   DIET NPO Except Meds    Anthropometric Measures:  · Height:  5' 2\" (157.5 cm)  · Current Body Wt:  106.6 kg (235 lb)   · Admission Body Wt:  240 lb(pt stated)    · Ideal Body Wt:  110 lbs:  213.6 %   · BMI Category:  Obese class 3 (BMI 40.0 or greater)       Nutrition Diagnosis:   · (Undesireable food choices) related to food and nutrition related knowledge deficit as evidenced by (pt reporting daily consumption of high fat, high sodium foods)    Nutrition Interventions:   Food and/or Nutrient Delivery: (resume following procedure)  Nutrition Education and Counseling: Education completed  Coordination of Nutrition Care: Continued inpatient monitoring    Goals:  Patient will increase knowledge of appropriate food choices on a cardiac diet prior to discharge.        Nutrition Monitoring and Evaluation:   Behavioral-Environmental Outcomes: Knowledge or skill  Food/Nutrient Intake Outcomes: Food and nutrient intake  Physical Signs/Symptoms Outcomes: Biochemical data, Weight    Discharge Planning:    Recommend pursue outpatient nutrition counseling     Electronically signed by Jean-Claude Perez RD on 8/21/2020 at 12:36 PM    Contact: [phone]

## 2020-08-21 NOTE — PROGRESS NOTES
[facility]      CARDIOLOGY PROGRESS NOTE  RECS:      1. Acute congestive heart failure-systolic dysfunction: Resolved clinically. Continue  Lasix and Aldactone. cardiac catheterization today that shows Conclusion Non obstructive epicardial coronary artery disease with known severe LV dysfunction. Will Continue intense medical management. Will obtain lifevest as outpatient. If EF remains low inspite of optimal therapy, may need ICD. 2. Non ischemic Cardiomyopathy with EF <15 %  On echo 8/20   3. Hypertension- labile continue with Coreg and ACEi. Would consider Entresto after diuresis/outpatient  4. Suspected COV-19   5. SOB- in the setting #1 and #6  Better   6. Morbid obesity- weight los strongly advised   7. LBBB- unknown onset  8.  MR- mild to moderate on Recent Echo         EKG Results     Procedure 720 Value Units Date/Time    EKG, 12 LEAD, SUBSEQUENT [[id number]] Collected:  08/18/20 0852    Order Status:  Completed Updated:  08/18/20 1017     Ventricular Rate 104 BPM      Atrial Rate 104 BPM      P-R Interval 136 ms      QRS Duration 154 ms      Q-T Interval 398 ms      QTC Calculation (Bezet) 523 ms      Calculated P Axis 37 degrees      Calculated R Axis -24 degrees      Calculated T Axis 41 degrees      Diagnosis --     Sinus tachycardia with occasional premature ventricular complexes  Left bundle branch block  Abnormal ECG  When compared with ECG of 17-AUG-2020 07:01,  premature ventricular complexes are now present  Left bundle branch block has replaced Nonspecific intraventricular block  Criteria for Lateral infarct are no longer present  Confirmed by Ratna Ramírez ([id number]) on 8/18/2020 10:17:25 AM      EKG, 12 LEAD, INITIAL [[id number]] Collected:  08/17/20 0701    Order Status:  Completed Updated:  08/17/20 1948     Ventricular Rate 90 BPM      Atrial Rate 90 BPM      P-R Interval 152 ms      QRS Duration 156 ms      Q-T Interval 420 ms      QTC Calculation (Bezet) 513 ms Calculated P Axis 51 degrees      Calculated R Axis -62 degrees      Calculated T Axis 65 degrees      Diagnosis --     Normal sinus rhythm  Possible Left atrial enlargement  Left axis deviation  Nonspecific intraventricular block  Lateral infarct , age undetermined  Abnormal ECG  When compared with ECG of 16-AUG-2020 10:11,  premature atrial complexes are no longer present  Nonspecific intraventricular block has replaced Left bundle branch block  Lateral infarct is now present  Confirmed by Casey Awan ([id number]) on 8/17/2020 7:48:20 PM      EKG, 12 LEAD, INITIAL [[id number]] Collected:  08/16/20 1011    Order Status:  Completed Updated:  08/16/20 1625     Ventricular Rate 126 BPM      Atrial Rate 126 BPM      P-R Interval 130 ms      QRS Duration 148 ms      Q-T Interval 356 ms      QTC Calculation (Bezet) 515 ms      Calculated P Axis -2 degrees      Calculated R Axis -49 degrees      Calculated T Axis 82 degrees      Diagnosis --     Sinus tachycardia with premature atrial complexes  Left axis deviation  Left bundle branch block  Abnormal ECG  No previous ECGs available  Confirmed by Meredith Velázquez ([id number]) on 8/16/2020 4:25:04 PM          XR Results (most recent):  Results from [place] encounter on 08/16/20   XR CHEST PA LAT    Narrative EXAMINATION: Chest 2 views    INDICATION: Pneumonia    COMPARISON: 8/17/2020    FINDINGS: Frontal and lateral views of the chest obtained. Borderline prominent  cardiac silhouette. Minimal aortic arch calcifications. Slightly prominent  perihilar interstitium. Retrocardiac opacity with air-fluid level consistent  with hiatal hernia. No evidence of pneumothorax. No acute osseous findings. Impression IMPRESSION:    Borderline prominent cardiac silhouette with mild perihilar interstitial  infiltrate/edema. Hiatal hernia.       08/16/20   ECHO ADULT FOLLOW-UP OR LIMITED 08/18/2020 8/18/2020    Narrative · LV: Estimated LVEF is <15%.  Visually measured ejection fraction. Normal   wall thickness. Moderately dilated left ventricle. Severely reduced   systolic function. · TV: Mild tricuspid valve regurgitation is present. · MV: Mild to moderate mitral valve regurgitation is present. · AV: Mild aortic valve regurgitation is present. · PA: Mild pulmonary hypertension. Pulmonary arterial systolic pressure is   41 mmHg. · COVID r/o  · RV: Mildly dilated right ventricle. Signed by: Greer Yao MD                 ASSESSMENT:  Hospital Problems  Never Reviewed          Codes Class Noted POA    Cardiomyopathy [facility]) ICD-10-CM: I42.9  ICD-9-CM: 425.4  8/18/2020 Unknown        Suspected COVID-19 virus infection ICD-10-CM: Z20.828  ICD-9-CM: V01.79  8/18/2020 Unknown        CAP (community acquired pneumonia) ICD-10-CM: J18.9  ICD-9-CM: 486  8/18/2020 Unknown        Elevated troponin ICD-10-CM: R79.89  ICD-9-CM: 790.6  8/18/2020 Unknown        [facility] ICD-10-CM: I50.9  ICD-9-CM: 428.0  8/16/2020 Unknown                SUBJECTIVE:  No chest pain  Improved shortness of breath    OBJECTIVE:    VS:   Visit Vitals  BP 93/63 (BP 1 Location: Left arm, BP Patient Position: At rest)   Pulse 72   Temp 97.9 °F (36.6 °C)   Resp 22   Ht 5' 2\" (1.575 m)   Wt 106.7 kg (235 lb 3.7 oz)   SpO2 95%   BMI 43.02 kg/m²         Intake/Output Summary (Last 24 hours) at 8/21/2020 1547  Last data filed at 8/21/2020 1057  Gross per 24 hour   Intake 0 ml   Output 400 ml   Net -400 ml     TELE: normal sinus rhythm    General: alert and in no apparent distress  HENT: Normocephalic, atraumatic. Normal external eye.   Neck :  no bruit, no JVD, JVD difficult to assess due to obesity  Cardiac:  regular rate and rhythm, S1, S2 normal, no murmur, click, rub or gallop  Chest/Lungs:chest clear, no wheezing, rales, normal symmetric air entry  Abdomen: Soft, nontender, no masses  Extremities:  No c/c/edema, peripheral pulses present      Labs: Results:       Chemistry Recent Labs     08/21/20  0540 08/20/20  0137 08/19/20  0215   GLU 86 92 85    140 140   K 4.0 3.7 3.9    109 110   CO2 28 26 22   BUN 21* 20* 19*   CREA 0.76 0.69 0.76   CA 8.8 9.0 9.0   MG  --  2.1  --    AGAP 5 5 8   BUCR 28* 29* 25*      CBC w/Diff Recent Labs     08/21/20  0540 08/20/20  0137 08/19/20  0215   WBC 6.2 6.3 5.9   RBC 4.98 4.97 4.90   HGB 13.7 13.8 13.6   HCT 43.1 42.6 41.6    187 183      Cardiac Enzymes No results for input(s): CPK, CKND1, YVONNE in the last 72 hours. No lab exists for component: CKRMB, TROIP   Coagulation No results for input(s): PTP, INR, APTT, INREXT, INREXT in the last 72 hours. Lipid Panel Lab Results   Component Value Date/Time    Cholesterol, total 142 08/18/2020 11:08 AM    HDL Cholesterol 52 08/18/2020 11:08 AM    LDL, calculated 75 08/18/2020 11:08 AM    VLDL, calculated 15 08/18/2020 11:08 AM    Triglyceride 75 08/18/2020 11:08 AM    CHOL/HDL Ratio 2.7 08/18/2020 11:08 AM      BNP No results for input(s): BNPP in the last 72 hours. Liver Enzymes No results for input(s): TP, ALB, TBIL, AP in the last 72 hours. No lab exists for component: SGOT, GPT, DBIL   Digoxin    Thyroid Studies Lab Results   Component Value Date/Time    TSH 2.50 08/16/2020 02:50 PM              CANDIE Giles  Supervised    I have independently evaluated and examined the patient. All relevant labs and testing data's are reviewed. Care plan discussed and updated after review.     Faizan Sharpe MD

## 2020-08-21 NOTE — PROGRESS NOTES
Pt has been scheduled a virtual appointment with [address] [place] [address] d/c to establish primary care. Virtual appt is Friday 8/28 at 215pm. Patient instructions entered on follow-up section of AVS.    FOC already obtained for personal touch home health. Pt will need LIFEVEST delivered prior to discharge. Juliáni sent the order.       Aniket Yung RN BSN  Care Manager  [phone]

## 2020-08-21 NOTE — PROGRESS NOTES
Hospitalist Progress Note    Patient: Fiordaliza Nassar Age: 71 y.o. : 1955 MR#: [id number] SSN: [id number]  Date/Time: 2020 2:28 PM    DOA: 2020  PCP: None    Subjective:     Patient was seen this AM, feels good overall. Shortness of breath has significantly improved. No overnight event. Has finished her antibiotics with low risk for PNA. No fever. No leukocytosis. No growth on blood culture  She awaited for cardiac cath today. Echo with EF<15 %  No leukocytosis   Low proCalc. No fever. Telemetry with tachyarhythmia    NEG COVID-19    Cardiac cath this afternoon:  Conclusion   Non obstructive epicardial coronary artery disease with known severe LV dysfunction. Continue intense medical management. Will obtain lifevest as outpatient. If EF remains low inspite of optimal therapy, may need ICD. Interval Hospital Course:  71 y.o. female with medical co-morbidities including hypothyroid and morbid obesity who presented with acute shortness of breath while walking her dog. She had associated with chest pressure but no chest palpitation. No fever, no cough. She denied recent sick contact. She was recently with symptom of renal stone but otherwise has been health. She denied leg swelling in the last days. In the ER, she was found tachycardic and had elevated troponin, CXR with pulmonary edema, CTA chest was without PE. She was given IV lasix and cardiology was consulted from ER. COVID-19 was suspected and sent. She has been awaiting for room at [facility] for the last days. She came to [facility] this AM for admission. She has improvement of her shortness of breath with lasix and antibiotics for suspected PNA. Her echo showed significant EF decrease. Her COVID-19 test was negative. Her repeat CXR without clear evidence of PNA, hence her antibiotics were stopped. Cardiology consulted and initially planned for stress test but she was given cardiac cath instead.      ROS: No current fever/chills, no headache, no dizziness, no facial pain, no sinus congestion,   No swallowing pain, No chest pain, no palpitation, + shortness of breath, no abd pain,  No diarrhea, no urinary complaint, no leg pain or swelling    Assessment/Plan:     1. Acute systolic CHF (congestive heart failure), decompensated, improved in proBNP  2   Non-obstructive CAD with demand ischemia on presentation, no MI   3. Sinus tachycardia, resolved   4. Bilateral groundglass opacities on CTA chest, Negative COVID-19 infection,        -possible CAP vs heart hear failure   5. Hypothyroidism   6. Hyperglycemia, resolved   7. Morbid obesity       Will continue optimal medical management, ASA, statin, carvedilol, lisinopril, aldactone  Will get LifeVest for home use at discharge   Lasix continue  Monitor on telemetry. Fluid restriction, low salt diet   pepcid   PT/OT appreciated.    Appreciated Cardiology help    DNR    Additional Notes:    Time spent >30 minutes    Case discussed with:  [x]Patient  []Family  [x]Nursing  [x]Case Management  DVT Prophylaxis:  [x]Lovenox  []Hep SQ  []SCDs  []Coumadin   []On Heparin gtt    Signed By: Ilan Solorzano MD     2020 2:28 PM              Objective:   VS:   Visit Vitals  BP 95/66 (BP 1 Location: Left arm, BP Patient Position: At rest)   Pulse 65   Temp 97.9 °F (36.6 °C)   Resp 20   Ht 5' 2\" (1.575 m)   Wt 106.7 kg (235 lb 3.7 oz)   SpO2 95%   BMI 43.02 kg/m²      Tmax/24hrs: Temp (24hrs), Av.9 °F (36.6 °C), Min:97.7 °F (36.5 °C), Max:98 °F (36.7 °C)      Intake/Output Summary (Last 24 hours) at 2020 1428  Last data filed at 2020 1057  Gross per 24 hour   Intake 0 ml   Output 400 ml   Net -400 ml       Tele: sinus  General:  Cooperative, Not in acute distress, speaks in full sentence while in bed  HEENT: PERRL, EOMI, supple neck, no JVD, dry oral mucosa  Cardiovascular: S1S2 regular, no rub/gallop   Pulmonary: Clear air entry bilaterally, no wheezing, ++ crackle  GI: Soft, non tender, non distended, +bs, no guarding   Extremities:  No pedal edema, +distal pulses appreciated   Neuro: AOx3, moving all extremities, no gross deficit. Additional:       Current Facility-Administered Medications   Medication Dose Route Frequency    sodium chloride (NS) flush 5-40 mL  5-40 mL IntraVENous Q8H    sodium chloride (NS) flush 5-40 mL  5-40 mL IntraVENous PRN    spironolactone (ALDACTONE) tablet 25 mg  25 mg Oral DAILY    furosemide (LASIX) tablet 20 mg  20 mg Oral DAILY    sodium chloride (NS) flush 5-40 mL  5-40 mL IntraVENous Q8H    sodium chloride (NS) flush 5-40 mL  5-40 mL IntraVENous PRN    acetaminophen (TYLENOL) tablet 650 mg  650 mg Oral Q6H PRN    Or    acetaminophen (TYLENOL) suppository 650 mg  650 mg Rectal Q6H PRN    polyethylene glycol (MIRALAX) packet 17 g  17 g Oral DAILY PRN    promethazine (PHENERGAN) tablet 12.5 mg  12.5 mg Oral Q6H PRN    Or    ondansetron (ZOFRAN) injection 4 mg  4 mg IntraVENous Q6H PRN    famotidine (PEPCID) tablet 20 mg  20 mg Oral BID    aspirin delayed-release tablet 81 mg  81 mg Oral DAILY    enoxaparin (LOVENOX) injection 40 mg  40 mg SubCUTAneous Q24H    carvediloL (COREG) tablet 3.125 mg  3.125 mg Oral BID WITH MEALS    lisinopriL (PRINIVIL, ZESTRIL) tablet 2.5 mg  2.5 mg Oral DAILY            Lab/Data Review:  Labs: Results:       Chemistry Recent Labs     08/21/20  0540 08/20/20  0137 08/19/20  0215   GLU 86 92 85    140 140   K 4.0 3.7 3.9    109 110   CO2 28 26 22   BUN 21* 20* 19*   CREA 0.76 0.69 0.76   BUCR 28* 29* 25*   AGAP 5 5 8   CA 8.8 9.0 9.0     No results for input(s): TBIL, ALT, ALKP, TP, ALB, GLOB, AGRAT in the last 72 hours.     No lab exists for component: SGOT   CBC w/Diff Recent Labs     08/21/20  0540 08/20/20  0137 08/19/20  0215   WBC 6.2 6.3 5.9   RBC 4.98 4.97 4.90   HGB 13.7 13.8 13.6   HCT 43.1 42.6 41.6   MCV 86.5 85.7 84.9   MCH 27.5 27.8 27.8   MCHC 31.8 32.4 32.7   RDW 16.0* 16.2* 16.2*    187 183      Coagulation No results for input(s): PTP, INR, APTT, INREXT, INREXT in the last 72 hours. Iron/Ferritin Lab Results   Component Value Date/Time    Ferritin 26 08/19/2020 02:15 AM       BNP    Cardiac Enzymes Lab Results   Component Value Date/Time    CK 75 08/18/2020 11:08 AM    CK - MB 2.3 08/18/2020 11:08 AM    CK-MB Index 3.1 08/18/2020 11:08 AM    Troponin-I, QT 0.05 (H) 08/18/2020 11:08 AM        Lactic Acid    Thyroid Studies          All Micro Results     Procedure Component Value Units Date/Time    CULTURE, BLOOD [[id number]] Collected:  08/18/20 1108    Order Status:  Completed Specimen:  Blood Updated:  08/21/20 0847     Special Requests: NO SPECIAL REQUESTS        Culture result: NO GROWTH 3 DAYS       CULTURE, BLOOD [[id number]] Collected:  08/18/20 1118    Order Status:  Completed Specimen:  Blood Updated:  08/21/20 0847     Special Requests: NO SPECIAL REQUESTS        Culture result: NO GROWTH 3 DAYS       STREP PNEUMO AG, URINE [[id number]] Collected:  08/18/20 1510    Order Status:  Completed Specimen:  Urine, random Updated:  08/18/20 2008     Strep pneumo Ag, urine Negative       LEGIONELLA PNEUMOPHILA AG, URINE [[id number]] Collected:  08/18/20 1510    Order Status:  Completed Specimen:  Urine, random Updated:  08/18/20 2008     Legionella Ag, urine Negative       CULTURE, RESPIRATORY/SPUTUM/BRONCH Rolena Dennise STAIN [[id number]] Collected:  08/18/20 0845    Order Status:  Canceled Specimen:  Sputum             Images:    CT (Most Recent).  CT Results (most recent):  Results from Hospital Encounter encounter on 08/16/20   CTA CHEST W OR W WO CONT    Narrative EXAM: CTA CHEST W OR W WO CONT    CLINICAL INDICATION/HISTORY : dyspnea, elevated d-dimer, CHF, eval for PE.    COMPARISON: Chest x-ray same day    TECHNIQUE: Thin section axial images were obtained from the thoracic inlet  through the upper abdomen after the uneventful administration of IV contrast.   Utilization of smart prep dynamic bolus enhancement, timing centered for  pulmonary artery delineation. Coronal and sagittal maximum intensity projection  (MIP) reconstructions were post-processed and utilized to better define  pulmonary artery anatomy and increase sensitivity for detecting emboli. 80 cc   Isovue-370 IV    All CT scans at this facility are performed using dose optimization of technique  as appropriate to a performed exam, to include automated exposure control,  adjustment of the mA and/or kV according to patient size (including appropriate  matching for site specific examinations), or use of iterative reconstruction  technique. FINDINGS:    Lungs: Diffuse interstitial thickening. Patchy groundglass opacities in all  lobes. Thyroid and chest wall: Unremarkable    Heart: No cardiomegaly or pericardial effusion. Aorta: Unremarkable for age    Pleural spaces: Small right pleural effusion. Trace left pleural effusion    Pulmonary Arteries: Diagnostic evaluation of the pulmonary arteries. No  intraluminal filling defect to suggest acute pulmonary embolism. Adenopathy: None. Upper abdomen: Large hiatal hernia    Bones: Unremarkable for age      Impression IMPRESSION:    1. No evidence for pulmonary embolism. .  2.  Mild diffuse interstitial prominence, likely edema. 3.  Patchy subtle groundglass opacities throughout the lungs bilaterally which  are nonspecific but could be due to edema or infectious or inflammatory process. Findings could be seen in covid pneumonia although are not typical appearance. Constellation of findings favors CHF. 4.  Small right and trace left pleural effusion. 5.  Large hiatal hernia            XRAY (Most Recent) XR Results (most recent):  Results from Hospital Encounter encounter on 08/16/20   XR CHEST PA LAT    Narrative EXAMINATION: Chest 2 views    INDICATION: Pneumonia    COMPARISON: 8/17/2020    FINDINGS: Frontal and lateral views of the chest obtained.  Borderline prominent  cardiac silhouette. Minimal aortic arch calcifications. Slightly prominent  perihilar interstitium. Retrocardiac opacity with air-fluid level consistent  with hiatal hernia. No evidence of pneumothorax. No acute osseous findings. Impression IMPRESSION:    Borderline prominent cardiac silhouette with mild perihilar interstitial  infiltrate/edema. Hiatal hernia. EKG No results found for this or any previous visit. 2D ECHO 08/16/20   ECHO ADULT FOLLOW-UP OR LIMITED 08/18/2020 8/18/2020    Narrative · LV: Estimated LVEF is <15%. Visually measured ejection fraction. Normal   wall thickness. Moderately dilated left ventricle. Severely reduced   systolic function. · TV: Mild tricuspid valve regurgitation is present. · MV: Mild to moderate mitral valve regurgitation is present. · AV: Mild aortic valve regurgitation is present. · PA: Mild pulmonary hypertension. Pulmonary arterial systolic pressure is   41 mmHg. · COVID r/o  · RV: Mildly dilated right ventricle.         Signed by: Farhat Dyson MD

## 2020-08-21 NOTE — DISCHARGE INSTRUCTIONS
Discharge Instructions    Patient: Jamal Britt MRN: [id number]  [facility]: [id number]    YOB: 1955  Age: 71 y.o. Sex: female    DOA: 8/16/2020 LOS:  LOS: 5 days   Discharge Date:      ACUTE DIAGNOSES:  1.  Acute systolic congestive heart failure, decompensated, improved in proBNP  2   Non-obstructive Coronary Artery Disease with demand ischemia, no myocardial infarction   3.  Sinus tachycardia, resolved   4.  Bilateral groundglass opacities on CTA chest, Negative COVID-19 infection,        -low suspicion for Community acquired pneumonia, finished IV antibiotics  5.  Hypothyroidism, stable  6.  Hyperglycemia, resolved   7.  Morbid obesity         DISCHARGE MEDICATIONS:         · It is important that you take the medication exactly as they are prescribed. · Keep your medication in the bottles provided by the pharmacist and keep a list of the medication names, dosages, and times to be taken in your wallet. · Do not take other medications without consulting your doctor. DIET:  Cardiac Diet and Low fat, Low cholesterol  Low salt (2 grams) diet, limit fluid intake to 1.5 - 2 liters daily. ACTIVITY: Activity as tolerated, avoid heavy lifting. No heavy exercise until seen by your cardiologist     ADDITIONAL INFORMATION: If you experience any of the following symptoms then please call your primary care physician or return to the emergency room if you cannot get hold of your doctor: Fever, chills, nausea, vomiting, diarrhea, change in mentation, falling, bleeding, shortness of breath. FOLLOW UP CARE:  PCP, you are to call and set up an appointment to see them in 1-2 weeks. Follow-up with Dr. Rebeca Quintero, or Dr Madina Ronquillo, cardiology within 2 weeks       Information obtained by :  I understand that if any problems occur once I am at home I am to contact my physician. I understand and acknowledge receipt of the instructions indicated above. Physician's or R.N.'s Signature                                                                  Date/Time                                                                                                                                              Patient or Representative Signature                                                          Date/Time    Jaida Diaz MD  8/21/2020  5:56 PM        DISCHARGE SUMMARY from Nurse PATIENT INSTRUCTIONS:    After general anesthesia or intravenous sedation, for 24 hours or while taking prescription Narcotics:  · Limit your activities  · Do not drive and operate hazardous machinery  · Do not make important personal or business decisions  · Do  not drink alcoholic beverages  · If you have not urinated within 8 hours after discharge, please contact your surgeon on call. Report the following to your surgeon:  · Excessive pain, swelling, redness or odor of or around the surgical area  · Temperature over 100.5  · Nausea and vomiting lasting longer than 4 hours or if unable to take medications  · Any signs of decreased circulation or nerve impairment to extremity: change in color, persistent  numbness, tingling, coldness or increase pain  · Any questions    What to do at Home:  Recommended activity: Activity as tolerated, no lift or pulling with right arm    If you experience any of the following symptoms chest pain, shortness of breath, increase swelling , please follow up with primary care physician or return to Emergency Room. *  Please give a list of your current medications to your Primary Care Provider. *  Please update this list whenever your medications are discontinued, doses are      changed, or new medications (including over-the-counter products) are added. *  Please carry medication information at all times in case of emergency situations.     These are general instructions for a healthy lifestyle:    No smoking/ No tobacco products/ Avoid exposure to second hand smoke  Surgeon General's Warning:  Quitting smoking now greatly reduces serious risk to your health. Obesity, smoking, and sedentary lifestyle greatly increases your risk for illness    A healthy diet, regular physical exercise & weight monitoring are important for maintaining a healthy lifestyle    You may be retaining fluid if you have a history of heart failure or if you experience any of the following symptoms:  Weight gain of 3 pounds or more overnight or 5 pounds in a week, increased swelling in our hands or feet or shortness of breath while lying flat in bed. Please call your doctor as soon as you notice any of these symptoms; do not wait until your next office visit. The discharge information has been reviewed with the patient. The patient verbalized understanding. Discharge medications reviewed with the patient and appropriate educational materials and side effects teaching were provided.   ___________________________________________________________________________________________________________________________________    Patient armband removed and shredded

## 2020-08-22 NOTE — ROUTINE PROCESS
1610 - pt returned to unit alert and oriented X4. Patient denies any pain or discomfort. Right wrist dressing clean dry and intact. No hematoma or bleeding noted. Radial pulse present. Safety splint in place. V/S WNLs Will continue to monitor.

## 2020-08-22 NOTE — PROGRESS NOTES
Congestive Heart Failure Education Packet given patient and reviewed patient verbalize understanding

## 2020-08-24 ENCOUNTER — PATIENT OUTREACH (OUTPATIENT)
Dept: CASE MANAGEMENT | Age: 65
End: 2020-08-24

## 2020-08-24 ENCOUNTER — TELEPHONE (OUTPATIENT)
Dept: CARDIOLOGY CLINIC | Age: 65
End: 2020-08-24

## 2020-08-24 LAB
BACTERIA SPEC CULT: NORMAL
BACTERIA SPEC CULT: NORMAL
SERVICE CMNT-IMP: NORMAL
SERVICE CMNT-IMP: NORMAL

## 2020-08-24 NOTE — PROGRESS NOTES
Patient contacted regarding recent discharge and COVID-19 risk. Discussed COVID-19 related testing which was available at this time. Test results were negative. Patient informed of results, if available? yes    Care Transition Nurse/ Ambulatory Care Manager/ LPN Care Coordinator contacted the patient by telephone to perform post discharge assessment. Verified name and  with patient as identifiers. Patient has following risk factors of: heart failure and pneumonia. CTN/ACM/LPN reviewed discharge instructions, medical action plan and red flags related to discharge diagnosis. Reviewed and educated them on any new and changed medications related to discharge diagnosis. Advised obtaining a 90-day supply of all daily and as-needed medications. Advance Care Planning:   Does patient have an Advance Directive: DNR on file     Education provided regarding infection prevention, and signs and symptoms of COVID-19 and when to seek medical attention with patient who verbalized understanding. Discussed exposure protocols and quarantine from [address] you at higher risk for severe illness  and given an opportunity for questions and concerns. The patient agrees to contact PCP office for questions related to their healthcare. CTN/ACM/LPN provided contact information for future reference. From CDC: Are you at higher risk for severe illness?  Wash your hands often.  Avoid close contact (6 feet, which is about two arm lengths) with people who are sick.  Put distance between yourself and other people if COVID-19 is spreading in your community.  Clean and disinfect frequently touched surfaces.  Avoid all cruise travel and non-essential air travel.  Call your healthcare professional if you have concerns about COVID-19 and your underlying condition or if you are sick.     For more information on steps you can take to protect yourself, see CDC's How to Protect Yourself      Patient/family/caregiver given information for [employer] Loop and agrees to enroll no  Patient's preferred e-mail:  n/a  Patient's preferred phone number: n/a  Based on Loop alert triggers, patient will be contacted by nurse care manager for worsening symptoms. Plan for follow-up call in 7-14 days based on severity of symptoms and risk factors.

## 2020-08-24 NOTE — TELEPHONE ENCOUNTER
Patient has hospital follow up with you on 9/15/20. She is wearing the life vest and wants to know if there are any restrictions for her ?

## 2020-09-07 ENCOUNTER — PATIENT OUTREACH (OUTPATIENT)
Dept: CASE MANAGEMENT | Age: 65
End: 2020-09-07

## 2020-09-15 ENCOUNTER — OFFICE VISIT (OUTPATIENT)
Dept: CARDIOLOGY CLINIC | Age: 65
End: 2020-09-15

## 2020-09-15 VITALS
SYSTOLIC BLOOD PRESSURE: 102 MMHG | OXYGEN SATURATION: 95 % | HEIGHT: 62 IN | BODY MASS INDEX: 40.48 KG/M2 | WEIGHT: 220 LBS | DIASTOLIC BLOOD PRESSURE: 57 MMHG | HEART RATE: 75 BPM

## 2020-09-15 DIAGNOSIS — I10 ESSENTIAL HYPERTENSION: ICD-10-CM

## 2020-09-15 DIAGNOSIS — E78.5 DYSLIPIDEMIA: ICD-10-CM

## 2020-09-15 DIAGNOSIS — I42.8 NON-ISCHEMIC CARDIOMYOPATHY (HCC): ICD-10-CM

## 2020-09-15 DIAGNOSIS — I50.22 SYSTOLIC CHF, CHRONIC (HCC): Primary | ICD-10-CM

## 2020-09-15 RX ORDER — SACUBITRIL AND VALSARTAN 24; 26 MG/1; MG/1
1 TABLET, FILM COATED ORAL 2 TIMES DAILY
Qty: 60 TAB | Refills: 4 | Status: SHIPPED | OUTPATIENT
Start: 2020-09-15 | End: 2020-10-29 | Stop reason: DRUGHIGH

## 2020-09-20 NOTE — PROGRESS NOTES
HISTORY OF PRESENT ILLNESS  Hartwell Severs is a 71 y.o. female. Hospital Follow Up   The history is provided by the patient. This is a chronic problem. The problem occurs daily. The problem has been gradually improving. Associated symptoms include shortness of breath. Pertinent negatives include no chest pain, no abdominal pain and no headaches. CHF   The history is provided by the patient. This is a chronic problem. The problem occurs daily. The problem has been gradually improving. Associated symptoms include shortness of breath. Pertinent negatives include no chest pain, no abdominal pain and no headaches. Review of Systems   Constitutional: Negative for chills, diaphoresis, fever and weight loss. HENT: Negative for ear pain and hearing loss. Eyes: Negative for blurred vision. Respiratory: Positive for shortness of breath. Negative for cough, hemoptysis, sputum production, wheezing and stridor. Cardiovascular: Negative for chest pain, palpitations, orthopnea, claudication, leg swelling and PND. Gastrointestinal: Negative for abdominal pain, heartburn, nausea and vomiting. Musculoskeletal: Negative for myalgias and neck pain. Skin: Negative for rash. Neurological: Negative for dizziness, tingling, tremors, focal weakness, loss of consciousness, weakness and headaches. Psychiatric/Behavioral: Negative for depression and suicidal ideas. No family history on file.     Past Medical History:   Diagnosis Date    Hypothyroid     Obesity        Past Surgical History:   Procedure Laterality Date    HX OTHER SURGICAL  2016    neck lift        Social History     Tobacco Use    Smoking status: Never Smoker    Smokeless tobacco: Never Used   Substance Use Topics    Alcohol use: Not Currently       No Known Allergies    Outpatient Medications Marked as Taking for the 9/15/20 encounter (Office Visit) with Sree Dolan MD   Medication Sig Dispense Refill    sacubitriL-valsartan (Entresto) 24-26 mg tablet Take 1 Tab by mouth two (2) times a day. 60 Tab 4    aspirin delayed-release 81 mg tablet Take 1 Tab by mouth daily. Indications: treatment to prevent a heart attack 90 Tab 1    carvediloL (COREG) 3.125 mg tablet Take 1 Tab by mouth two (2) times daily (with meals). Indications: chronic heart failure, high blood pressure 60 Tab 3    furosemide (LASIX) 20 mg tablet Take 1 Tab by mouth daily. Indications: high blood pressure, fluid in the lungs due to chronic heart failure 30 Tab 2    spironolactone (ALDACTONE) 25 mg tablet Take 1 Tab by mouth daily. Indications: heart failure with reduced ejection fraction, high blood pressure 30 Tab 3    atorvastatin (LIPITOR) 20 mg tablet Take 1 Tab by mouth nightly. Indications: hardening of the arteries due to plaque buildup 30 Tab 2    estradioL (VIVELLE) 0.05 mg/24 hr APPLY 1 PATCH ON THE SKIN TWICE A WEEK      progesterone (PROMETRIUM) 200 mg capsule TAKE 1 CAPSULE BY MOUTH AT BEDTIME      traZODone (DESYREL) 50 mg tablet TAKE 1 TABLET BY MOUTH ONCE DAILY AT BEDTIME      NP Thyroid 60 mg tablet TAKE 1 TABLET BY MOUTH ONCE DAILY IN THE MORNING          Visit Vitals  BP (!) 102/57 (BP 1 Location: Left arm, BP Patient Position: Sitting)   Pulse 75   Ht 5' 2\" (1.575 m)   Wt 99.8 kg (220 lb)   SpO2 95%   BMI 40.24 kg/m²       Physical Exam   Constitutional: She is oriented to person, place, and time. She appears well-developed and well-nourished. HENT:   Head: Normocephalic and atraumatic. Eyes: Conjunctivae and EOM are normal. No scleral icterus. Neck: Normal range of motion. Neck supple. No JVD present. Cardiovascular: Normal rate, regular rhythm and normal heart sounds. Exam reveals no gallop and no friction rub. No murmur heard. Pulmonary/Chest: Effort normal and breath sounds normal. No stridor. No respiratory distress. She has no wheezes. She has no rales. She exhibits no tenderness. Abdominal: She exhibits no distension.  There is no abdominal tenderness. Musculoskeletal: Normal range of motion. General: No tenderness or edema. Lymphadenopathy:     She has no cervical adenopathy. Neurological: She is alert and oriented to person, place, and time. No cranial nerve deficit. Skin: No rash noted. No erythema. Psychiatric: She has a normal mood and affect. Her behavior is normal.     08/16/20   ECHO ADULT FOLLOW-UP OR LIMITED 08/18/2020 8/18/2020    Narrative · LV: Estimated LVEF is <15%. Visually measured ejection fraction. Normal   wall thickness. Moderately dilated left ventricle. Severely reduced   systolic function. · TV: Mild tricuspid valve regurgitation is present. · MV: Mild to moderate mitral valve regurgitation is present. · AV: Mild aortic valve regurgitation is present. · PA: Mild pulmonary hypertension. Pulmonary arterial systolic pressure is   41 mmHg. · COVID r/o  · RV: Mildly dilated right ventricle. Signed by: Michael Guarddao MD     08/16/20   CARDIAC PROCEDURE 08/24/2020 8/24/2020    Narrative Non obstructive epicardial coronary artery disease with known severe LV   dysfunction. Continue intense medical management. Will obtain lifevest as outpatient. If EF remains low inspite of optimal therapy, may need ICD. Signed by: Paige Herron MD       ASSESSMENT and PLAN    ICD-10-CM ICD-9-CM    1. Systolic CHF, chronic (HCC)  I50.22 428.22 MAGNESIUM     962.7 METABOLIC PANEL, BASIC      ECHO ADULT COMPLETE   2. Non-ischemic cardiomyopathy (HCC)  I42.8 425.4    3. Essential hypertension  I10 401.9    4.  Dyslipidemia  E78.5 272.4      Orders Placed This Encounter    MAGNESIUM     Standing Status:   Future     Standing Expiration Date:   6/71/2031    METABOLIC PANEL, BASIC     Standing Status:   Future     Standing Expiration Date:   9/16/2021    ECHO ADULT COMPLETE     Standing Status:   Future     Standing Expiration Date:   3/18/2021     Order Specific Question:   Contrast Enhancement (Bubble Study, Definity, Optison) may be used if criteria listed in established evidence-based protocol has been identified. Answer: Yes    sacubitriL-valsartan (Entresto) 24-26 mg tablet     Sig: Take 1 Tab by mouth two (2) times a day. Dispense:  60 Tab     Refill:  4     Follow-up and Dispositions    · Return in about 2 months (around 11/15/2020). Patient is a 50-year-old female recently admitted with acute systolic CHF. Subsequent coronary angiogram revealed patent epicardial coronary arteries. We will discontinue lisinopril and start patient on Entresto. Will repeat echo to assess LV function. F/u in 2 months.

## 2020-09-25 LAB
BUN SERPL-MCNC: 13 MG/DL (ref 8–27)
BUN/CREAT SERPL: 19 (ref 12–28)
CALCIUM SERPL-MCNC: 9.3 MG/DL (ref 8.7–10.3)
CHLORIDE SERPL-SCNC: 105 MMOL/L (ref 96–106)
CO2 SERPL-SCNC: 21 MMOL/L (ref 20–29)
CREAT SERPL-MCNC: 0.68 MG/DL (ref 0.57–1)
GLUCOSE SERPL-MCNC: 96 MG/DL (ref 65–99)
MAGNESIUM SERPL-MCNC: 2.1 MG/DL (ref 1.6–2.3)
POTASSIUM SERPL-SCNC: 4.2 MMOL/L (ref 3.5–5.2)
SODIUM SERPL-SCNC: 140 MMOL/L (ref 134–144)
SPECIMEN STATUS REPORT, ROLRST: NORMAL

## 2020-10-29 ENCOUNTER — OFFICE VISIT (OUTPATIENT)
Dept: CARDIOLOGY CLINIC | Age: 65
End: 2020-10-29
Payer: MEDICARE

## 2020-10-29 VITALS
TEMPERATURE: 97.6 F | DIASTOLIC BLOOD PRESSURE: 51 MMHG | WEIGHT: 214.6 LBS | BODY MASS INDEX: 39.49 KG/M2 | OXYGEN SATURATION: 96 % | HEART RATE: 69 BPM | SYSTOLIC BLOOD PRESSURE: 102 MMHG | HEIGHT: 62 IN

## 2020-10-29 DIAGNOSIS — I42.8 NON-ISCHEMIC CARDIOMYOPATHY (HCC): ICD-10-CM

## 2020-10-29 DIAGNOSIS — I50.22 SYSTOLIC CHF, CHRONIC (HCC): Primary | ICD-10-CM

## 2020-10-29 DIAGNOSIS — I10 ESSENTIAL HYPERTENSION: ICD-10-CM

## 2020-10-29 PROCEDURE — G8536 NO DOC ELDER MAL SCRN: HCPCS | Performed by: INTERNAL MEDICINE

## 2020-10-29 PROCEDURE — 3017F COLORECTAL CA SCREEN DOC REV: CPT | Performed by: INTERNAL MEDICINE

## 2020-10-29 PROCEDURE — G8432 DEP SCR NOT DOC, RNG: HCPCS | Performed by: INTERNAL MEDICINE

## 2020-10-29 PROCEDURE — G8428 CUR MEDS NOT DOCUMENT: HCPCS | Performed by: INTERNAL MEDICINE

## 2020-10-29 PROCEDURE — G8400 PT W/DXA NO RESULTS DOC: HCPCS | Performed by: INTERNAL MEDICINE

## 2020-10-29 PROCEDURE — 1100F PTFALLS ASSESS-DOCD GE2>/YR: CPT | Performed by: INTERNAL MEDICINE

## 2020-10-29 PROCEDURE — 1090F PRES/ABSN URINE INCON ASSESS: CPT | Performed by: INTERNAL MEDICINE

## 2020-10-29 PROCEDURE — G8417 CALC BMI ABV UP PARAM F/U: HCPCS | Performed by: INTERNAL MEDICINE

## 2020-10-29 PROCEDURE — 3288F FALL RISK ASSESSMENT DOCD: CPT | Performed by: INTERNAL MEDICINE

## 2020-10-29 PROCEDURE — 99214 OFFICE O/P EST MOD 30 MIN: CPT | Performed by: INTERNAL MEDICINE

## 2020-10-29 RX ORDER — FUROSEMIDE 20 MG/1
20 TABLET ORAL DAILY
Qty: 90 TAB | Refills: 3 | Status: CANCELLED | OUTPATIENT
Start: 2020-10-29

## 2020-10-29 RX ORDER — CARVEDILOL 3.12 MG/1
3.12 TABLET ORAL 2 TIMES DAILY WITH MEALS
Qty: 180 TAB | Refills: 3 | Status: SHIPPED | OUTPATIENT
Start: 2020-10-29 | End: 2021-11-04

## 2020-10-29 RX ORDER — SACUBITRIL AND VALSARTAN 49; 51 MG/1; MG/1
1 TABLET, FILM COATED ORAL 2 TIMES DAILY
Qty: 60 TAB | Refills: 4 | Status: SHIPPED | OUTPATIENT
Start: 2020-10-29 | End: 2021-04-06

## 2020-10-29 NOTE — PROGRESS NOTES
1. Have you been to the ER, urgent care clinic since your last visit? Hospitalized since your last visit?     no    2. Have you seen or consulted any other health care providers outside of the [address] [place] [address] since your last visit? Include any pap smears or colon screening. Yes When: x 1-2 months ago  Where: [facility] Reason for visit: routine             Requested Prescriptions     Pending Prescriptions Disp Refills    carvediloL (COREG) 3.125 mg tablet 180 Tab 3     Sig: Take 1 Tab by mouth two (2) times daily (with meals). Indications: chronic heart failure, high blood pressure    furosemide (LASIX) 20 mg tablet 90 Tab 3     Sig: Take 1 Tab by mouth daily.  Indications: high blood pressure, fluid in the lungs due to chronic heart failure

## 2020-10-29 NOTE — PROGRESS NOTES
HISTORY OF PRESENT ILLNESS  Melania Erickson is a 71 y.o. female. CHF   The history is provided by the patient. This is a chronic problem. The problem occurs daily. The problem has been gradually improving. Associated symptoms include shortness of breath. Review of Systems   Constitutional: Negative for chills, diaphoresis, fever and weight loss. HENT: Negative for ear pain and hearing loss. Eyes: Negative for blurred vision. Respiratory: Positive for shortness of breath. Negative for cough, hemoptysis, sputum production, wheezing and stridor. Cardiovascular: Negative for palpitations, orthopnea, claudication, leg swelling and PND. Gastrointestinal: Negative for heartburn, nausea and vomiting. Musculoskeletal: Negative for myalgias and neck pain. Skin: Negative for rash. Neurological: Negative for dizziness, tingling, tremors, focal weakness, loss of consciousness and weakness. Psychiatric/Behavioral: Negative for depression and suicidal ideas. No family history on file. Past Medical History:   Diagnosis Date    Hypothyroid     Obesity        Past Surgical History:   Procedure Laterality Date    HX OTHER SURGICAL  2016    neck lift        Social History     Tobacco Use    Smoking status: Never Smoker    Smokeless tobacco: Never Used   Substance Use Topics    Alcohol use: Not Currently       No Known Allergies    Outpatient Medications Marked as Taking for the 10/29/20 encounter (Office Visit) with Ani Valerio MD   Medication Sig Dispense Refill    carvediloL (COREG) 3.125 mg tablet Take 1 Tab by mouth two (2) times daily (with meals). Indications: chronic heart failure, high blood pressure 180 Tab 3    sacubitriL-valsartan (Entresto) 49-51 mg tab tablet Take 1 Tab by mouth two (2) times a day. 60 Tab 4    aspirin delayed-release 81 mg tablet Take 1 Tab by mouth daily.  Indications: treatment to prevent a heart attack 90 Tab 1    furosemide (LASIX) 20 mg tablet Take 1 Tab by mouth daily. Indications: high blood pressure, fluid in the lungs due to chronic heart failure 30 Tab 2    atorvastatin (LIPITOR) 20 mg tablet Take 1 Tab by mouth nightly. Indications: hardening of the arteries due to plaque buildup 30 Tab 2    estradioL (VIVELLE) 0.05 mg/24 hr APPLY 1 PATCH ON THE SKIN TWICE A WEEK      progesterone (PROMETRIUM) 200 mg capsule Take 300 mg by mouth daily.  traZODone (DESYREL) 50 mg tablet TAKE 1 TABLET BY MOUTH ONCE DAILY AT BEDTIME      NP Thyroid 60 mg tablet TAKE 1 TABLET BY MOUTH ONCE DAILY IN THE MORNING      buPROPion XL (WELLBUTRIN XL) 150 mg tablet           Visit Vitals  BP (!) 102/51 (BP 1 Location: Left arm, BP Patient Position: Sitting)   Pulse 69   Temp 97.6 °F (36.4 °C) (Temporal)   Ht 5' 2\" (1.575 m)   Wt 97.3 kg (214 lb 9.6 oz)   SpO2 96%   BMI 39.25 kg/m²       Physical Exam   Constitutional: She is oriented to person, place, and time. She appears well-developed and well-nourished. HENT:   Head: Normocephalic and atraumatic. Eyes: Conjunctivae and EOM are normal. No scleral icterus. Neck: Normal range of motion. Neck supple. No JVD present. Cardiovascular: Normal rate, regular rhythm and normal heart sounds. Exam reveals no gallop and no friction rub. No murmur heard. Pulmonary/Chest: Effort normal and breath sounds normal. No stridor. No respiratory distress. She has no wheezes. She has no rales. She exhibits no tenderness. Abdominal: She exhibits no distension. There is no abdominal tenderness. Musculoskeletal: Normal range of motion. General: No tenderness or edema. Lymphadenopathy:     She has no cervical adenopathy. Neurological: She is alert and oriented to person, place, and time. No cranial nerve deficit. Skin: No rash noted. No erythema. Psychiatric: She has a normal mood and affect.  Her behavior is normal.     08/16/20   ECHO ADULT FOLLOW-UP OR LIMITED 08/18/2020 8/18/2020    Narrative · LV: Estimated LVEF is <15%. Visually measured ejection fraction. Normal   wall thickness. Moderately dilated left ventricle. Severely reduced   systolic function. · TV: Mild tricuspid valve regurgitation is present. · MV: Mild to moderate mitral valve regurgitation is present. · AV: Mild aortic valve regurgitation is present. · PA: Mild pulmonary hypertension. Pulmonary arterial systolic pressure is   41 mmHg. · COVID r/o  · RV: Mildly dilated right ventricle. Signed by: Deanna Eduardo MD     08/16/20   CARDIAC PROCEDURE 08/24/2020 8/24/2020    Narrative Non obstructive epicardial coronary artery disease with known severe LV   dysfunction. Continue intense medical management. Will obtain lifevest as outpatient. If EF remains low inspite of optimal therapy, may need ICD. Signed by: Denice Stanley MD     10/23/20   ECHO ADULT COMPLETE 10/23/2020 10/23/2020    Narrative · LV: Estimated LVEF is 15 - 20%. Normal wall thickness. Dilated left   ventricle. Severely and globally reduced systolic function. Inconclusive   left ventricular diastolic function. The findings are consistent with   dilated cardiomyopathy. · LA: Moderately dilated left atrium. Left Atrium volume index is 45.57   mL/m2. · RV: Mildly dilated right ventricle. · MV: Mild mitral valve regurgitation is present. · AV: Mild aortic valve regurgitation is present. · TV: Mild tricuspid valve regurgitation is present. · AO: Mild ascending aorta dilatation. Ascending aorta diameter = 4 cm. · PA: Pulmonary arterial systolic pressure is 25 mmHg. Signed by: Denice Stanley MD       ASSESSMENT and PLAN    ICD-10-CM ICD-9-CM    1. Systolic CHF, chronic (HCC)  I50.22 428.22 MAGNESIUM     357.7 METABOLIC PANEL, BASIC   2. Non-ischemic cardiomyopathy (HCC)  I42.8 425.4    3.  Essential hypertension  I10 401.9      Orders Placed This Encounter    MAGNESIUM     Standing Status:   Future     Standing Expiration Date:   10/30/2021   Miguel Angel Obrien METABOLIC PANEL, BASIC     Standing Status:   Future     Standing Expiration Date:   10/30/2021    carvediloL (COREG) 3.125 mg tablet     Sig: Take 1 Tab by mouth two (2) times daily (with meals). Indications: chronic heart failure, high blood pressure     Dispense:  180 Tab     Refill:  3    sacubitriL-valsartan (Entresto) 49-51 mg tab tablet     Sig: Take 1 Tab by mouth two (2) times a day. Dispense:  60 Tab     Refill:  4     Follow-up and Dispositions    · Return in about 2 months (around 12/29/2020). Patient is a 78-year-old female recently admitted with acute systolic CHF. Subsequent coronary angiogram revealed patent epicardial coronary arteries. Currently being managed for HFrEF stage C NYHA class II. On Lifevest.  Will increase entresto and discontinue lasix.   Will repeat bmp/ mg.

## 2020-11-11 LAB
BUN SERPL-MCNC: 23 MG/DL (ref 8–27)
BUN/CREAT SERPL: 39 (ref 12–28)
CALCIUM SERPL-MCNC: 9.1 MG/DL (ref 8.7–10.3)
CHLORIDE SERPL-SCNC: 110 MMOL/L (ref 96–106)
CO2 SERPL-SCNC: 21 MMOL/L (ref 20–29)
CREAT SERPL-MCNC: 0.59 MG/DL (ref 0.57–1)
GLUCOSE SERPL-MCNC: 95 MG/DL (ref 65–99)
MAGNESIUM SERPL-MCNC: 2 MG/DL (ref 1.6–2.3)
POTASSIUM SERPL-SCNC: 4.4 MMOL/L (ref 3.5–5.2)
SODIUM SERPL-SCNC: 142 MMOL/L (ref 134–144)
SPECIMEN STATUS REPORT, ROLRST: NORMAL

## 2020-12-15 ENCOUNTER — OFFICE VISIT (OUTPATIENT)
Dept: CARDIOLOGY CLINIC | Age: 65
End: 2020-12-15
Payer: MEDICARE

## 2020-12-15 VITALS
WEIGHT: 213 LBS | BODY MASS INDEX: 39.2 KG/M2 | TEMPERATURE: 97.5 F | OXYGEN SATURATION: 98 % | DIASTOLIC BLOOD PRESSURE: 46 MMHG | SYSTOLIC BLOOD PRESSURE: 100 MMHG | HEART RATE: 55 BPM | HEIGHT: 62 IN

## 2020-12-15 DIAGNOSIS — I50.22 SYSTOLIC CHF, CHRONIC (HCC): Primary | ICD-10-CM

## 2020-12-15 DIAGNOSIS — I42.8 NON-ISCHEMIC CARDIOMYOPATHY (HCC): ICD-10-CM

## 2020-12-15 DIAGNOSIS — E78.5 DYSLIPIDEMIA: ICD-10-CM

## 2020-12-15 DIAGNOSIS — I10 ESSENTIAL HYPERTENSION: ICD-10-CM

## 2020-12-15 PROCEDURE — 1090F PRES/ABSN URINE INCON ASSESS: CPT | Performed by: INTERNAL MEDICINE

## 2020-12-15 PROCEDURE — 3017F COLORECTAL CA SCREEN DOC REV: CPT | Performed by: INTERNAL MEDICINE

## 2020-12-15 PROCEDURE — G8400 PT W/DXA NO RESULTS DOC: HCPCS | Performed by: INTERNAL MEDICINE

## 2020-12-15 PROCEDURE — 3288F FALL RISK ASSESSMENT DOCD: CPT | Performed by: INTERNAL MEDICINE

## 2020-12-15 PROCEDURE — G8417 CALC BMI ABV UP PARAM F/U: HCPCS | Performed by: INTERNAL MEDICINE

## 2020-12-15 PROCEDURE — G8432 DEP SCR NOT DOC, RNG: HCPCS | Performed by: INTERNAL MEDICINE

## 2020-12-15 PROCEDURE — G8536 NO DOC ELDER MAL SCRN: HCPCS | Performed by: INTERNAL MEDICINE

## 2020-12-15 PROCEDURE — G8428 CUR MEDS NOT DOCUMENT: HCPCS | Performed by: INTERNAL MEDICINE

## 2020-12-15 PROCEDURE — 99214 OFFICE O/P EST MOD 30 MIN: CPT | Performed by: INTERNAL MEDICINE

## 2020-12-15 PROCEDURE — 1100F PTFALLS ASSESS-DOCD GE2>/YR: CPT | Performed by: INTERNAL MEDICINE

## 2020-12-15 NOTE — PROGRESS NOTES
1. Have you been to the ER, urgent care clinic since your last visit? Hospitalized since your last visit?     no    2. Have you seen or consulted any other health care providers outside of the [address] [place] [address] since your last visit? Include any pap smears or colon screening. Yes x 1 day ago with PCP       3. Do you need any refills today?    no

## 2020-12-15 NOTE — PROGRESS NOTES
HISTORY OF PRESENT ILLNESS  Anette Wiseman is a 76 y.o. female. CHF   The history is provided by the patient. This is a chronic problem. The problem occurs daily. The problem has been gradually improving. Associated symptoms include shortness of breath. Review of Systems   Constitutional: Negative for chills, diaphoresis, fever and weight loss. HENT: Negative for ear pain and hearing loss. Eyes: Negative for blurred vision. Respiratory: Positive for shortness of breath. Negative for cough, hemoptysis, sputum production, wheezing and stridor. Cardiovascular: Negative for palpitations, orthopnea, claudication, leg swelling and PND. Gastrointestinal: Negative for heartburn, nausea and vomiting. Musculoskeletal: Negative for myalgias and neck pain. Skin: Negative for rash. Neurological: Negative for dizziness, tingling, tremors, focal weakness, loss of consciousness and weakness. Psychiatric/Behavioral: Negative for depression and suicidal ideas. No family history on file. Past Medical History:   Diagnosis Date    Hypothyroid     Obesity        Past Surgical History:   Procedure Laterality Date    HX OTHER SURGICAL  2016    neck lift        Social History     Tobacco Use    Smoking status: Never Smoker    Smokeless tobacco: Never Used   Substance Use Topics    Alcohol use: Not Currently       No Known Allergies    Outpatient Medications Marked as Taking for the 12/15/20 encounter (Office Visit) with Sydney Greer MD   Medication Sig Dispense Refill    carvediloL (COREG) 3.125 mg tablet Take 1 Tab by mouth two (2) times daily (with meals). Indications: chronic heart failure, high blood pressure 180 Tab 3    sacubitriL-valsartan (Entresto) 49-51 mg tab tablet Take 1 Tab by mouth two (2) times a day. 60 Tab 4    aspirin delayed-release 81 mg tablet Take 1 Tab by mouth daily.  Indications: treatment to prevent a heart attack 90 Tab 1    estradioL (VIVELLE) 0.05 mg/24 hr APPLY 1 PATCH ON THE SKIN TWICE A WEEK      progesterone (PROMETRIUM) 200 mg capsule Take 300 mg by mouth daily.  traZODone (DESYREL) 50 mg tablet TAKE 1 TABLET BY MOUTH ONCE DAILY AT BEDTIME      NP Thyroid 60 mg tablet TAKE 1 TABLET BY MOUTH ONCE DAILY IN THE MORNING          Visit Vitals  BP (!) 100/46 (BP 1 Location: Left arm, BP Patient Position: Sitting)   Pulse (!) 55   Temp 97.5 °F (36.4 °C) (Temporal)   Ht 5' 2\" (1.575 m)   Wt 96.6 kg (213 lb)   SpO2 98%   BMI 38.96 kg/m²       Physical Exam   Constitutional: She is oriented to person, place, and time. She appears well-developed and well-nourished. HENT:   Head: Normocephalic and atraumatic. Eyes: Conjunctivae and EOM are normal. No scleral icterus. Neck: Normal range of motion. Neck supple. No JVD present. Cardiovascular: Normal rate, regular rhythm and normal heart sounds. Exam reveals no gallop and no friction rub. No murmur heard. Pulmonary/Chest: Effort normal and breath sounds normal. No stridor. No respiratory distress. She has no wheezes. She has no rales. She exhibits no tenderness. Abdominal: She exhibits no distension. There is no abdominal tenderness. Musculoskeletal: Normal range of motion. General: No tenderness or edema. Lymphadenopathy:     She has no cervical adenopathy. Neurological: She is alert and oriented to person, place, and time. No cranial nerve deficit. Skin: No rash noted. No erythema. Psychiatric: She has a normal mood and affect. Her behavior is normal.     08/16/20   ECHO ADULT FOLLOW-UP OR LIMITED 08/18/2020 8/18/2020    Narrative · LV: Estimated LVEF is <15%. Visually measured ejection fraction. Normal   wall thickness. Moderately dilated left ventricle. Severely reduced   systolic function. · TV: Mild tricuspid valve regurgitation is present. · MV: Mild to moderate mitral valve regurgitation is present. · AV: Mild aortic valve regurgitation is present. · PA:  Mild pulmonary hypertension. Pulmonary arterial systolic pressure is   41 mmHg. · COVID r/o  · RV: Mildly dilated right ventricle. Signed by: Elsi Rivera MD     08/16/20   CARDIAC PROCEDURE 08/24/2020 8/24/2020    Narrative Non obstructive epicardial coronary artery disease with known severe LV   dysfunction. Continue intense medical management. Will obtain lifevest as outpatient. If EF remains low inspite of optimal therapy, may need ICD. Signed by: Malcolm Carson MD     10/23/20   ECHO ADULT COMPLETE 10/23/2020 10/23/2020    Narrative · LV: Estimated LVEF is 15 - 20%. Normal wall thickness. Dilated left   ventricle. Severely and globally reduced systolic function. Inconclusive   left ventricular diastolic function. The findings are consistent with   dilated cardiomyopathy. · LA: Moderately dilated left atrium. Left Atrium volume index is 45.57   mL/m2. · RV: Mildly dilated right ventricle. · MV: Mild mitral valve regurgitation is present. · AV: Mild aortic valve regurgitation is present. · TV: Mild tricuspid valve regurgitation is present. · AO: Mild ascending aorta dilatation. Ascending aorta diameter = 4 cm. · PA: Pulmonary arterial systolic pressure is 25 mmHg. Signed by: Malcolm Carson MD       ASSESSMENT and PLAN    ICD-10-CM ICD-9-CM    1. Systolic CHF, chronic (HCC)  I50.22 428.22 ECHO ADULT COMPLETE     428.0    2. Non-ischemic cardiomyopathy (HCC)  I42.8 425.4    3. Essential hypertension  I10 401.9    4. Dyslipidemia  E78.5 272.4      Orders Placed This Encounter    ECHO ADULT COMPLETE     Standing Status:   Future     Standing Expiration Date:   6/17/2021     Order Specific Question:   Contrast Enhancement (Bubble Study, Definity, Optison) may be used if criteria listed in established evidence-based protocol has been identified. Answer:   Yes     Follow-up and Dispositions    · Return in about 2 months (around 2/15/2021).        Patient is a 69-year-old female recently admitted with acute systolic CHF. Subsequent coronary angiogram revealed patent epicardial coronary arteries. Currently being managed for HFrEF stage C NYHA class II. On Lifevest.  On optimal medical therapy. Will repeat echo to assess LV function and consider ICD if Ef remains low.

## 2021-02-16 ENCOUNTER — OFFICE VISIT (OUTPATIENT)
Dept: CARDIOLOGY CLINIC | Age: 66
End: 2021-02-16
Payer: MEDICARE

## 2021-02-16 VITALS
WEIGHT: 217 LBS | SYSTOLIC BLOOD PRESSURE: 116 MMHG | HEART RATE: 52 BPM | BODY MASS INDEX: 39.93 KG/M2 | OXYGEN SATURATION: 97 % | DIASTOLIC BLOOD PRESSURE: 38 MMHG | TEMPERATURE: 97.9 F | HEIGHT: 62 IN

## 2021-02-16 DIAGNOSIS — E78.5 DYSLIPIDEMIA: ICD-10-CM

## 2021-02-16 DIAGNOSIS — I42.8 NON-ISCHEMIC CARDIOMYOPATHY (HCC): ICD-10-CM

## 2021-02-16 DIAGNOSIS — I10 ESSENTIAL HYPERTENSION: ICD-10-CM

## 2021-02-16 DIAGNOSIS — I50.22 SYSTOLIC CHF, CHRONIC (HCC): Primary | ICD-10-CM

## 2021-02-16 PROCEDURE — 3288F FALL RISK ASSESSMENT DOCD: CPT | Performed by: INTERNAL MEDICINE

## 2021-02-16 PROCEDURE — G8754 DIAS BP LESS 90: HCPCS | Performed by: INTERNAL MEDICINE

## 2021-02-16 PROCEDURE — G8417 CALC BMI ABV UP PARAM F/U: HCPCS | Performed by: INTERNAL MEDICINE

## 2021-02-16 PROCEDURE — G8536 NO DOC ELDER MAL SCRN: HCPCS | Performed by: INTERNAL MEDICINE

## 2021-02-16 PROCEDURE — G8752 SYS BP LESS 140: HCPCS | Performed by: INTERNAL MEDICINE

## 2021-02-16 PROCEDURE — 3017F COLORECTAL CA SCREEN DOC REV: CPT | Performed by: INTERNAL MEDICINE

## 2021-02-16 PROCEDURE — 1090F PRES/ABSN URINE INCON ASSESS: CPT | Performed by: INTERNAL MEDICINE

## 2021-02-16 PROCEDURE — 1100F PTFALLS ASSESS-DOCD GE2>/YR: CPT | Performed by: INTERNAL MEDICINE

## 2021-02-16 PROCEDURE — G8432 DEP SCR NOT DOC, RNG: HCPCS | Performed by: INTERNAL MEDICINE

## 2021-02-16 PROCEDURE — G8428 CUR MEDS NOT DOCUMENT: HCPCS | Performed by: INTERNAL MEDICINE

## 2021-02-16 PROCEDURE — G8400 PT W/DXA NO RESULTS DOC: HCPCS | Performed by: INTERNAL MEDICINE

## 2021-02-16 PROCEDURE — 99214 OFFICE O/P EST MOD 30 MIN: CPT | Performed by: INTERNAL MEDICINE

## 2021-02-16 NOTE — PROGRESS NOTES
1. Have you been to the ER, urgent care clinic since your last visit? Hospitalized since your last visit?     no    2. Have you seen or consulted any other health care providers outside of the [address] [place] [address] since your last visit? Include any pap smears or colon screening. Yes When: x 6 month ago with PCP     3. Do you need any refills today?    No

## 2021-02-16 NOTE — PROGRESS NOTES
HISTORY OF PRESENT ILLNESS  Nicole Gomez is a 76 y.o. female. CHF  The history is provided by the patient. This is a chronic problem. The problem occurs daily. The problem has been gradually improving. Associated symptoms include shortness of breath. Review of Systems   Constitutional: Negative for chills, diaphoresis, fever and weight loss. HENT: Negative for ear pain and hearing loss. Eyes: Negative for blurred vision. Respiratory: Positive for shortness of breath. Negative for cough, hemoptysis, sputum production, wheezing and stridor. Cardiovascular: Negative for palpitations, orthopnea, claudication, leg swelling and PND. Gastrointestinal: Negative for heartburn, nausea and vomiting. Musculoskeletal: Negative for myalgias and neck pain. Skin: Negative for rash. Neurological: Negative for dizziness, tingling, tremors, focal weakness, loss of consciousness and weakness. Psychiatric/Behavioral: Negative for depression and suicidal ideas. No family history on file. Past Medical History:   Diagnosis Date    Hypothyroid     Obesity        Past Surgical History:   Procedure Laterality Date    HX OTHER SURGICAL  2016    neck lift        Social History     Tobacco Use    Smoking status: Never Smoker    Smokeless tobacco: Never Used   Substance Use Topics    Alcohol use: Not Currently       No Known Allergies    Outpatient Medications Marked as Taking for the 2/16/21 encounter (Office Visit) with Ellis Meza MD   Medication Sig Dispense Refill    carvediloL (COREG) 3.125 mg tablet Take 1 Tab by mouth two (2) times daily (with meals). Indications: chronic heart failure, high blood pressure 180 Tab 3    sacubitriL-valsartan (Entresto) 49-51 mg tab tablet Take 1 Tab by mouth two (2) times a day. 60 Tab 4    aspirin delayed-release 81 mg tablet Take 1 Tab by mouth daily.  Indications: treatment to prevent a heart attack 90 Tab 1    estradioL (VIVELLE) 0.05 mg/24 hr APPLY 1 PATCH ON THE SKIN TWICE A WEEK      progesterone (PROMETRIUM) 200 mg capsule Take 300 mg by mouth daily.  traZODone (DESYREL) 50 mg tablet TAKE 1 TABLET BY MOUTH ONCE DAILY AT BEDTIME      NP Thyroid 60 mg tablet TAKE 1 TABLET BY MOUTH ONCE DAILY IN THE MORNING          Visit Vitals  BP (!) 116/38 (BP 1 Location: Left upper arm, BP Patient Position: Sitting, BP Cuff Size: Large adult)   Pulse (!) 52   Temp 97.9 °F (36.6 °C) (Temporal)   Ht 5' 2\" (1.575 m)   Wt 98.4 kg (217 lb)   SpO2 97%   BMI 39.69 kg/m²       Physical Exam   Constitutional: She is oriented to person, place, and time. She appears well-developed and well-nourished. HENT:   Head: Normocephalic and atraumatic. Eyes: Conjunctivae and EOM are normal. No scleral icterus. Neck: Normal range of motion. Neck supple. No JVD present. Cardiovascular: Normal rate, regular rhythm and normal heart sounds. Exam reveals no gallop and no friction rub. No murmur heard. Pulmonary/Chest: Effort normal and breath sounds normal. No stridor. No respiratory distress. She has no wheezes. She has no rales. She exhibits no tenderness. Abdominal: She exhibits no distension. There is no abdominal tenderness. Musculoskeletal: Normal range of motion. General: No tenderness or edema. Lymphadenopathy:     She has no cervical adenopathy. Neurological: She is alert and oriented to person, place, and time. No cranial nerve deficit. Skin: No rash noted. No erythema. Psychiatric: She has a normal mood and affect. Her behavior is normal.     08/16/20   ECHO ADULT FOLLOW-UP OR LIMITED 08/18/2020 8/18/2020    Narrative · LV: Estimated LVEF is <15%. Visually measured ejection fraction. Normal   wall thickness. Moderately dilated left ventricle. Severely reduced   systolic function. · TV: Mild tricuspid valve regurgitation is present. · MV: Mild to moderate mitral valve regurgitation is present.   · AV: Mild aortic valve regurgitation is present. · PA: Mild pulmonary hypertension. Pulmonary arterial systolic pressure is   41 mmHg. · COVID r/o  · RV: Mildly dilated right ventricle. Signed by: Adelina Ziegler MD     08/16/20   CARDIAC PROCEDURE 08/24/2020 8/24/2020    Narrative Non obstructive epicardial coronary artery disease with known severe LV   dysfunction. Continue intense medical management. Will obtain lifevest as outpatient. If EF remains low inspite of optimal therapy, may need ICD. Signed by: Austen Black MD     10/23/20   ECHO ADULT COMPLETE 10/23/2020 10/23/2020    Narrative · LV: Estimated LVEF is 15 - 20%. Normal wall thickness. Dilated left   ventricle. Severely and globally reduced systolic function. Inconclusive   left ventricular diastolic function. The findings are consistent with   dilated cardiomyopathy. · LA: Moderately dilated left atrium. Left Atrium volume index is 45.57   mL/m2. · RV: Mildly dilated right ventricle. · MV: Mild mitral valve regurgitation is present. · AV: Mild aortic valve regurgitation is present. · TV: Mild tricuspid valve regurgitation is present. · AO: Mild ascending aorta dilatation. Ascending aorta diameter = 4 cm. · PA: Pulmonary arterial systolic pressure is 25 mmHg. Signed by: Austen Black MD       ASSESSMENT and PLAN    ICD-10-CM ICD-9-CM    1. Systolic CHF, chronic (HCC)  I50.22 428.22 ECHO ADULT FOLLOW-UP OR LIMITED     428.0    2. Non-ischemic cardiomyopathy (HCC)  I42.8 425.4    3. Essential hypertension  I10 401.9    4. Dyslipidemia  E78.5 272.4      Orders Placed This Encounter    ECHO ADULT FOLLOW-UP OR LIMITED     Standing Status:   Future     Standing Expiration Date:   8/16/2021     Order Specific Question:   Contrast Enhancement (Bubble Study, Definity, Optison) may be used if criteria listed in established evidence-based protocol has been identified.      Answer:   Yes     Order Specific Question:   Enhanced Imaging (Myocardial Strain, 3D post-processing) may be used if criteria listed in established evidence-based protocol has been identified. Answer:   No     Follow-up and Dispositions    · Return in about 4 months (around 6/16/2021). Patient is a 67-year-old female recently admitted with acute systolic CHF. Subsequent coronary angiogram revealed patent epicardial coronary arteries. Currently being managed for HFrEF stage C NYHA class II. On Lifevest.  Repeat echo report reviewed with patient. EF is now 30 to 35%. Been on LifeVest for most 6 months and wishes to discontinue it. Discussed at length regarding the risk of ventricular arrhythmias in the setting of LV dysfunction- however since her ejection fraction has improved her risk is low compared to an EF of 20 to 25%. She will now return LifeVest.  Understands the risk of sudden cardiac death. Continue current medications and follow-up in 3 to 4 months with repeat echo to assess LV function.

## 2021-04-06 RX ORDER — SACUBITRIL AND VALSARTAN 49; 51 MG/1; MG/1
TABLET, FILM COATED ORAL
Qty: 60 TAB | Refills: 4 | Status: SHIPPED | OUTPATIENT
Start: 2021-04-06 | End: 2021-04-07 | Stop reason: SDUPTHER

## 2021-04-07 RX ORDER — SACUBITRIL AND VALSARTAN 49; 51 MG/1; MG/1
1 TABLET, FILM COATED ORAL 2 TIMES DAILY
Qty: 60 TAB | Refills: 5 | Status: SHIPPED | OUTPATIENT
Start: 2021-04-07 | End: 2022-02-21

## 2021-06-22 ENCOUNTER — OFFICE VISIT (OUTPATIENT)
Dept: CARDIOLOGY CLINIC | Age: 66
End: 2021-06-22
Payer: MEDICARE

## 2021-06-22 VITALS
OXYGEN SATURATION: 95 % | WEIGHT: 220 LBS | BODY MASS INDEX: 40.48 KG/M2 | HEIGHT: 62 IN | SYSTOLIC BLOOD PRESSURE: 114 MMHG | HEART RATE: 57 BPM | DIASTOLIC BLOOD PRESSURE: 54 MMHG

## 2021-06-22 DIAGNOSIS — I10 ESSENTIAL HYPERTENSION: ICD-10-CM

## 2021-06-22 DIAGNOSIS — I50.32 DIASTOLIC CHF, CHRONIC (HCC): ICD-10-CM

## 2021-06-22 DIAGNOSIS — E78.5 DYSLIPIDEMIA: ICD-10-CM

## 2021-06-22 DIAGNOSIS — I42.8 NON-ISCHEMIC CARDIOMYOPATHY (HCC): Primary | ICD-10-CM

## 2021-06-22 PROCEDURE — 99214 OFFICE O/P EST MOD 30 MIN: CPT | Performed by: INTERNAL MEDICINE

## 2021-06-22 PROCEDURE — 1100F PTFALLS ASSESS-DOCD GE2>/YR: CPT | Performed by: INTERNAL MEDICINE

## 2021-06-22 PROCEDURE — G8417 CALC BMI ABV UP PARAM F/U: HCPCS | Performed by: INTERNAL MEDICINE

## 2021-06-22 PROCEDURE — 3017F COLORECTAL CA SCREEN DOC REV: CPT | Performed by: INTERNAL MEDICINE

## 2021-06-22 PROCEDURE — G8536 NO DOC ELDER MAL SCRN: HCPCS | Performed by: INTERNAL MEDICINE

## 2021-06-22 PROCEDURE — G8432 DEP SCR NOT DOC, RNG: HCPCS | Performed by: INTERNAL MEDICINE

## 2021-06-22 PROCEDURE — G8754 DIAS BP LESS 90: HCPCS | Performed by: INTERNAL MEDICINE

## 2021-06-22 PROCEDURE — G8428 CUR MEDS NOT DOCUMENT: HCPCS | Performed by: INTERNAL MEDICINE

## 2021-06-22 PROCEDURE — 1090F PRES/ABSN URINE INCON ASSESS: CPT | Performed by: INTERNAL MEDICINE

## 2021-06-22 PROCEDURE — G8400 PT W/DXA NO RESULTS DOC: HCPCS | Performed by: INTERNAL MEDICINE

## 2021-06-22 PROCEDURE — G8752 SYS BP LESS 140: HCPCS | Performed by: INTERNAL MEDICINE

## 2021-06-22 PROCEDURE — 3288F FALL RISK ASSESSMENT DOCD: CPT | Performed by: INTERNAL MEDICINE

## 2021-06-29 NOTE — PROGRESS NOTES
HISTORY OF PRESENT ILLNESS  Cj Mcdowell is a 76 y.o. female. Shortness of Breath  The history is provided by the patient. This is a chronic problem. The problem has been gradually improving. Pertinent negatives include no fever, no ear pain, no neck pain, no cough, no sputum production, no hemoptysis, no wheezing, no PND, no orthopnea, no vomiting, no rash, no leg swelling and no claudication. Associated medical issues include heart failure. Review of Systems   Constitutional: Negative for chills, diaphoresis, fever and weight loss. HENT: Negative for ear pain and hearing loss. Eyes: Negative for blurred vision. Respiratory: Positive for shortness of breath. Negative for cough, hemoptysis, sputum production, wheezing and stridor. Cardiovascular: Negative for palpitations, orthopnea, claudication, leg swelling and PND. Gastrointestinal: Negative for heartburn, nausea and vomiting. Musculoskeletal: Negative for myalgias and neck pain. Skin: Negative for rash. Neurological: Negative for dizziness, tingling, tremors, focal weakness, loss of consciousness and weakness. Psychiatric/Behavioral: Negative for depression and suicidal ideas. No family history on file. Past Medical History:   Diagnosis Date    Hypothyroid     Obesity        Past Surgical History:   Procedure Laterality Date    HX OTHER SURGICAL  2016    neck lift        Social History     Tobacco Use    Smoking status: Never Smoker    Smokeless tobacco: Never Used   Substance Use Topics    Alcohol use: Not Currently       No Known Allergies    Outpatient Medications Marked as Taking for the 6/22/21 encounter (Office Visit) with Will Jeffers MD   Medication Sig Dispense Refill    sacubitriL-valsartan (Entresto) 49-51 mg tab tablet Take 1 Tab by mouth two (2) times a day. 60 Tab 5    carvediloL (COREG) 3.125 mg tablet Take 1 Tab by mouth two (2) times daily (with meals).  Indications: chronic heart failure, high blood pressure 180 Tab 3    aspirin delayed-release 81 mg tablet Take 1 Tab by mouth daily. Indications: treatment to prevent a heart attack 90 Tab 1    estradioL (VIVELLE) 0.05 mg/24 hr APPLY 1 PATCH ON THE SKIN TWICE A WEEK      progesterone (PROMETRIUM) 200 mg capsule Take 300 mg by mouth daily.  traZODone (DESYREL) 50 mg tablet TAKE 1 TABLET BY MOUTH ONCE DAILY AT BEDTIME      NP Thyroid 60 mg tablet TAKE 1 TABLET BY MOUTH ONCE DAILY IN THE MORNING          Visit Vitals  BP (!) 114/54 (BP 1 Location: Left upper arm, BP Patient Position: Sitting, BP Cuff Size: Large adult)   Pulse (!) 57   Ht 5' 2\" (1.575 m)   Wt 99.8 kg (220 lb)   SpO2 95%   BMI 40.24 kg/m²       Physical Exam  Constitutional:       Appearance: She is well-developed. HENT:      Head: Normocephalic and atraumatic. Eyes:      General: No scleral icterus. Conjunctiva/sclera: Conjunctivae normal.   Neck:      Vascular: No JVD. Cardiovascular:      Rate and Rhythm: Normal rate and regular rhythm. Heart sounds: Normal heart sounds. No murmur heard. No friction rub. No gallop. Pulmonary:      Effort: Pulmonary effort is normal. No respiratory distress. Breath sounds: Normal breath sounds. No stridor. No wheezing or rales. Chest:      Chest wall: No tenderness. Abdominal:      General: There is no distension. Tenderness: There is no abdominal tenderness. Musculoskeletal:         General: No tenderness. Normal range of motion. Cervical back: Normal range of motion and neck supple. Lymphadenopathy:      Cervical: No cervical adenopathy. Skin:     Findings: No erythema or rash. Neurological:      Mental Status: She is alert and oriented to person, place, and time. Cranial Nerves: No cranial nerve deficit. Psychiatric:         Behavior: Behavior normal.       08/16/20   ECHO ADULT FOLLOW-UP OR LIMITED 08/18/2020 8/18/2020    Narrative · LV: Estimated LVEF is <15%.  Visually measured ejection fraction. Normal   wall thickness. Moderately dilated left ventricle. Severely reduced   systolic function. · TV: Mild tricuspid valve regurgitation is present. · MV: Mild to moderate mitral valve regurgitation is present. · AV: Mild aortic valve regurgitation is present. · PA: Mild pulmonary hypertension. Pulmonary arterial systolic pressure is   41 mmHg. · COVID r/o  · RV: Mildly dilated right ventricle. Signed by: Dorie Mccall MD     08/16/20   CARDIAC PROCEDURE 08/24/2020 8/24/2020    Narrative Non obstructive epicardial coronary artery disease with known severe LV   dysfunction. Continue intense medical management. Will obtain lifevest as outpatient. If EF remains low inspite of optimal therapy, may need ICD. Signed by: Klever Sim MD     10/23/20   ECHO ADULT COMPLETE 10/23/2020 10/23/2020    Narrative · LV: Estimated LVEF is 15 - 20%. Normal wall thickness. Dilated left   ventricle. Severely and globally reduced systolic function. Inconclusive   left ventricular diastolic function. The findings are consistent with   dilated cardiomyopathy. · LA: Moderately dilated left atrium. Left Atrium volume index is 45.57   mL/m2. · RV: Mildly dilated right ventricle. · MV: Mild mitral valve regurgitation is present. · AV: Mild aortic valve regurgitation is present. · TV: Mild tricuspid valve regurgitation is present. · AO: Mild ascending aorta dilatation. Ascending aorta diameter = 4 cm. · PA: Pulmonary arterial systolic pressure is 25 mmHg. Signed by: Klever Sim MD   05/26/21    ECHO ADULT FOLLOW-UP OR LIMITED 05/28/2021 5/28/2021    Interpretation Summary  · LV: Estimated LVEF is 40 - 45%. Normal cavity size. Mild concentric hypertrophy. Mild hypokinesis of the basal inferoseptal, mid inferoseptal, basal inferior, mid inferior, basal anteroseptal and mid anteroseptal wall(s). Moderate (grade 2) left ventricular diastolic dysfunction.   · LA: Moderately dilated left atrium. Left Atrium volume index is 43.96 mL/m2. · RV: Mildly dilated right ventricle. · RA: Mildly dilated right atrium. · MV: Mild mitral valve regurgitation is present. · TV: Mild tricuspid valve regurgitation is present. Right Ventricular Arterial Pressure (RVSP) is 32 mmHg. Pulmonary hypertension not suggested by Doppler findings. · AO: Mild aortic root and ascending aorta dilatation. Aortic root diameter = 3.7 cm. Ascending aorta diameter = 4.1 cm. Signed by: Ankita Anthony MD on 5/28/2021  3:52 PM      ASSESSMENT and PLAN    ICD-10-CM ICD-9-CM    1. Non-ischemic cardiomyopathy ([address]  I42.8 425.4    2. Essential hypertension  I10 401.9    3. Dyslipidemia  E78.5 272.4    4. Diastolic CHF, chronic (HCC)  I50.32 428.32      428.0      No orders of the defined types were placed in this encounter. Follow-up and Dispositions    · Return in about 4 months (around 10/22/2021). Patient is a 42-year-old female recently admitted with acute systolic CHF. Subsequent coronary angiogram revealed patent epicardial coronary arteries. Patient with history of HFrEF stage C NYHA class II-on Coreg and Entresto. Repeat echo revealed significant improvement in ejection fraction. Now being managed for HFpEF with NYHA class II symptoms  No indication for ICD at this time. Advised to continue current medications. Follow-up in 4 months to reevaluate symptoms/meds.

## 2021-10-19 ENCOUNTER — OFFICE VISIT (OUTPATIENT)
Dept: CARDIOLOGY CLINIC | Age: 66
End: 2021-10-19
Payer: MEDICARE

## 2021-10-19 VITALS
SYSTOLIC BLOOD PRESSURE: 131 MMHG | DIASTOLIC BLOOD PRESSURE: 56 MMHG | HEART RATE: 56 BPM | WEIGHT: 224 LBS | OXYGEN SATURATION: 94 % | BODY MASS INDEX: 41.22 KG/M2 | HEIGHT: 62 IN

## 2021-10-19 DIAGNOSIS — I42.8 NON-ISCHEMIC CARDIOMYOPATHY (HCC): ICD-10-CM

## 2021-10-19 DIAGNOSIS — E78.5 DYSLIPIDEMIA: ICD-10-CM

## 2021-10-19 DIAGNOSIS — I10 ESSENTIAL HYPERTENSION: Primary | ICD-10-CM

## 2021-10-19 DIAGNOSIS — I50.22 SYSTOLIC CHF, CHRONIC (HCC): ICD-10-CM

## 2021-10-19 PROCEDURE — 1090F PRES/ABSN URINE INCON ASSESS: CPT | Performed by: INTERNAL MEDICINE

## 2021-10-19 PROCEDURE — 1101F PT FALLS ASSESS-DOCD LE1/YR: CPT | Performed by: INTERNAL MEDICINE

## 2021-10-19 PROCEDURE — G8400 PT W/DXA NO RESULTS DOC: HCPCS | Performed by: INTERNAL MEDICINE

## 2021-10-19 PROCEDURE — G8417 CALC BMI ABV UP PARAM F/U: HCPCS | Performed by: INTERNAL MEDICINE

## 2021-10-19 PROCEDURE — G8752 SYS BP LESS 140: HCPCS | Performed by: INTERNAL MEDICINE

## 2021-10-19 PROCEDURE — G8754 DIAS BP LESS 90: HCPCS | Performed by: INTERNAL MEDICINE

## 2021-10-19 PROCEDURE — 93000 ELECTROCARDIOGRAM COMPLETE: CPT | Performed by: INTERNAL MEDICINE

## 2021-10-19 PROCEDURE — 99214 OFFICE O/P EST MOD 30 MIN: CPT | Performed by: INTERNAL MEDICINE

## 2021-10-19 PROCEDURE — G8536 NO DOC ELDER MAL SCRN: HCPCS | Performed by: INTERNAL MEDICINE

## 2021-10-19 PROCEDURE — G8428 CUR MEDS NOT DOCUMENT: HCPCS | Performed by: INTERNAL MEDICINE

## 2021-10-19 PROCEDURE — 3017F COLORECTAL CA SCREEN DOC REV: CPT | Performed by: INTERNAL MEDICINE

## 2021-10-19 PROCEDURE — G8432 DEP SCR NOT DOC, RNG: HCPCS | Performed by: INTERNAL MEDICINE

## 2021-10-19 NOTE — PROGRESS NOTES
1. Have you been to the ER, urgent care clinic since your last visit? Hospitalized since your last visit? No    2. Have you seen or consulted any other health care providers outside of the [address] [place] [address] since your last visit? Include any pap smears or colon screening.  No

## 2021-10-19 NOTE — PROGRESS NOTES
HISTORY OF PRESENT ILLNESS  Boo Nash is a 76 y.o. female. Shortness of Breath  The history is provided by the patient. This is a chronic problem. The problem has been gradually improving. Pertinent negatives include no fever, no ear pain, no neck pain, no cough, no sputum production, no hemoptysis, no wheezing, no PND, no orthopnea, no vomiting, no rash, no leg swelling and no claudication. Associated medical issues include heart failure. Review of Systems   Constitutional: Negative for chills, diaphoresis, fever and weight loss. HENT: Negative for ear pain and hearing loss. Eyes: Negative for blurred vision. Respiratory: Negative for cough, hemoptysis, sputum production, wheezing and stridor. Cardiovascular: Negative for palpitations, orthopnea, claudication, leg swelling and PND. Gastrointestinal: Negative for heartburn, nausea and vomiting. Musculoskeletal: Negative for myalgias and neck pain. Skin: Negative for rash. Neurological: Negative for dizziness, tingling, tremors, focal weakness, loss of consciousness and weakness. Psychiatric/Behavioral: Negative for depression and suicidal ideas. No family history on file. Past Medical History:   Diagnosis Date    Hypothyroid     Obesity        Past Surgical History:   Procedure Laterality Date    HX OTHER SURGICAL  2016    neck lift        Social History     Tobacco Use    Smoking status: Never Smoker    Smokeless tobacco: Never Used   Substance Use Topics    Alcohol use: Not Currently       No Known Allergies    Outpatient Medications Marked as Taking for the 10/19/21 encounter (Office Visit) with Danielle Allison MD   Medication Sig Dispense Refill    sacubitriL-valsartan (Entresto) 49-51 mg tab tablet Take 1 Tab by mouth two (2) times a day. 60 Tab 5    carvediloL (COREG) 3.125 mg tablet Take 1 Tab by mouth two (2) times daily (with meals).  Indications: chronic heart failure, high blood pressure 180 Tab 3  aspirin delayed-release 81 mg tablet Take 1 Tab by mouth daily. Indications: treatment to prevent a heart attack 90 Tab 1    estradioL (VIVELLE) 0.05 mg/24 hr APPLY 1 PATCH ON THE SKIN TWICE A WEEK      progesterone (PROMETRIUM) 200 mg capsule Take 300 mg by mouth daily.  traZODone (DESYREL) 50 mg tablet TAKE 1 TABLET BY MOUTH ONCE DAILY AT BEDTIME      NP Thyroid 60 mg tablet TAKE 1 TABLET BY MOUTH ONCE DAILY IN THE MORNING          Visit Vitals  BP (!) 131/56 (BP 1 Location: Left upper arm, BP Patient Position: Sitting, BP Cuff Size: Adult)   Pulse (!) 56   Ht 5' 2\" (1.575 m)   Wt 101.6 kg (224 lb)   SpO2 94%   BMI 40.97 kg/m²       Physical Exam  Constitutional:       Appearance: She is well-developed. HENT:      Head: Normocephalic and atraumatic. Eyes:      General: No scleral icterus. Conjunctiva/sclera: Conjunctivae normal.   Neck:      Vascular: No JVD. Cardiovascular:      Rate and Rhythm: Normal rate and regular rhythm. Heart sounds: Normal heart sounds. No murmur heard. No friction rub. No gallop. Pulmonary:      Effort: Pulmonary effort is normal. No respiratory distress. Breath sounds: Normal breath sounds. No stridor. No wheezing or rales. Chest:      Chest wall: No tenderness. Abdominal:      General: There is no distension. Tenderness: There is no abdominal tenderness. Musculoskeletal:         General: No tenderness. Normal range of motion. Cervical back: Normal range of motion and neck supple. Lymphadenopathy:      Cervical: No cervical adenopathy. Skin:     Findings: No erythema or rash. Neurological:      Mental Status: She is alert and oriented to person, place, and time. Cranial Nerves: No cranial nerve deficit. Psychiatric:         Behavior: Behavior normal.       08/16/20   ECHO ADULT FOLLOW-UP OR LIMITED 08/18/2020 8/18/2020    Narrative · LV: Estimated LVEF is <15%. Visually measured ejection fraction. Normal   wall thickness. Moderately dilated left ventricle. Severely reduced   systolic function. · TV: Mild tricuspid valve regurgitation is present. · MV: Mild to moderate mitral valve regurgitation is present. · AV: Mild aortic valve regurgitation is present. · PA: Mild pulmonary hypertension. Pulmonary arterial systolic pressure is   41 mmHg. · COVID r/o  · RV: Mildly dilated right ventricle. Signed by: Joellen Amaya MD     08/16/20   CARDIAC PROCEDURE 08/24/2020 8/24/2020    Narrative Non obstructive epicardial coronary artery disease with known severe LV   dysfunction. Continue intense medical management. Will obtain lifevest as outpatient. If EF remains low inspite of optimal therapy, may need ICD. Signed by: Yakelin Sibley MD     10/23/20   ECHO ADULT COMPLETE 10/23/2020 10/23/2020    Narrative · LV: Estimated LVEF is 15 - 20%. Normal wall thickness. Dilated left   ventricle. Severely and globally reduced systolic function. Inconclusive   left ventricular diastolic function. The findings are consistent with   dilated cardiomyopathy. · LA: Moderately dilated left atrium. Left Atrium volume index is 45.57   mL/m2. · RV: Mildly dilated right ventricle. · MV: Mild mitral valve regurgitation is present. · AV: Mild aortic valve regurgitation is present. · TV: Mild tricuspid valve regurgitation is present. · AO: Mild ascending aorta dilatation. Ascending aorta diameter = 4 cm. · PA: Pulmonary arterial systolic pressure is 25 mmHg. Signed by: Yakelin Sibley MD   05/26/21    ECHO ADULT FOLLOW-UP OR LIMITED 05/28/2021 5/28/2021    Interpretation Summary  · LV: Estimated LVEF is 40 - 45%. Normal cavity size. Mild concentric hypertrophy. Mild hypokinesis of the basal inferoseptal, mid inferoseptal, basal inferior, mid inferior, basal anteroseptal and mid anteroseptal wall(s). Moderate (grade 2) left ventricular diastolic dysfunction. · LA: Moderately dilated left atrium.  Left Atrium volume index is 43.96 mL/m2. · RV: Mildly dilated right ventricle. · RA: Mildly dilated right atrium. · MV: Mild mitral valve regurgitation is present. · TV: Mild tricuspid valve regurgitation is present. Right Ventricular Arterial Pressure (RVSP) is 32 mmHg. Pulmonary hypertension not suggested by Doppler findings. · AO: Mild aortic root and ascending aorta dilatation. Aortic root diameter = 3.7 cm. Ascending aorta diameter = 4.1 cm. Signed by: Kasey Napier MD on 5/28/2021  3:52 PM      ASSESSMENT and PLAN    ICD-10-CM ICD-9-CM    1. Essential hypertension  I10 401.9 AMB POC EKG ROUTINE W/ 12 LEADS, INTER & REP   2. Non-ischemic cardiomyopathy (HCC)  I42.8 425.4    3. Systolic CHF, chronic (HCC)  I50.22 428.22 ECHO ADULT COMPLETE     428.0    4. Dyslipidemia  E78.5 272.4      Orders Placed This Encounter    AMB POC EKG ROUTINE W/ 12 LEADS, INTER & REP     Order Specific Question:   Reason for Exam:     Answer:   HTN    ECHO ADULT COMPLETE     Standing Status:   Future     Standing Expiration Date:   4/21/2022     Order Specific Question:   Contrast Enhancement (Bubble Study, Definity, Optison) may be used if criteria listed in established evidence-based protocol has been identified. Answer:   Yes     Follow-up and Dispositions    · Return in about 4 months (around 2/19/2022). Patient is a 69-year-old female recently admitted with acute systolic CHF. Subsequent coronary angiogram revealed patent epicardial coronary arteries. Patient with history of HFrEF stage C NYHA class II-on Coreg and Entresto. Repeat echo revealed significant improvement in ejection fraction. Now being managed for HFpEF with NYHA class II symptoms  No indication for ICD at this time. Reports fatigue secondary to BB/ bradycardia. Recommend to continue current meds  Will repeat echo to assess LV function. Will readjust meds in the next appt.   All questions answered

## 2021-11-04 RX ORDER — CARVEDILOL 3.12 MG/1
TABLET ORAL
Qty: 180 TABLET | Refills: 3 | Status: SHIPPED | OUTPATIENT
Start: 2021-11-04

## 2022-02-15 ENCOUNTER — OFFICE VISIT (OUTPATIENT)
Dept: CARDIOLOGY CLINIC | Age: 67
End: 2022-02-15
Payer: MEDICARE

## 2022-02-15 VITALS
BODY MASS INDEX: 43.35 KG/M2 | SYSTOLIC BLOOD PRESSURE: 139 MMHG | DIASTOLIC BLOOD PRESSURE: 51 MMHG | WEIGHT: 237 LBS | HEART RATE: 53 BPM

## 2022-02-15 DIAGNOSIS — E78.5 DYSLIPIDEMIA: ICD-10-CM

## 2022-02-15 DIAGNOSIS — I10 ESSENTIAL HYPERTENSION: ICD-10-CM

## 2022-02-15 DIAGNOSIS — I42.8 NON-ISCHEMIC CARDIOMYOPATHY (HCC): ICD-10-CM

## 2022-02-15 DIAGNOSIS — I50.22 SYSTOLIC CHF, CHRONIC (HCC): Primary | ICD-10-CM

## 2022-02-15 PROCEDURE — G8752 SYS BP LESS 140: HCPCS | Performed by: INTERNAL MEDICINE

## 2022-02-15 PROCEDURE — 1101F PT FALLS ASSESS-DOCD LE1/YR: CPT | Performed by: INTERNAL MEDICINE

## 2022-02-15 PROCEDURE — 3017F COLORECTAL CA SCREEN DOC REV: CPT | Performed by: INTERNAL MEDICINE

## 2022-02-15 PROCEDURE — G8536 NO DOC ELDER MAL SCRN: HCPCS | Performed by: INTERNAL MEDICINE

## 2022-02-15 PROCEDURE — G8432 DEP SCR NOT DOC, RNG: HCPCS | Performed by: INTERNAL MEDICINE

## 2022-02-15 PROCEDURE — G8428 CUR MEDS NOT DOCUMENT: HCPCS | Performed by: INTERNAL MEDICINE

## 2022-02-15 PROCEDURE — G8417 CALC BMI ABV UP PARAM F/U: HCPCS | Performed by: INTERNAL MEDICINE

## 2022-02-15 PROCEDURE — 1090F PRES/ABSN URINE INCON ASSESS: CPT | Performed by: INTERNAL MEDICINE

## 2022-02-15 PROCEDURE — G8754 DIAS BP LESS 90: HCPCS | Performed by: INTERNAL MEDICINE

## 2022-02-15 PROCEDURE — 99214 OFFICE O/P EST MOD 30 MIN: CPT | Performed by: INTERNAL MEDICINE

## 2022-02-15 PROCEDURE — 93000 ELECTROCARDIOGRAM COMPLETE: CPT | Performed by: INTERNAL MEDICINE

## 2022-02-15 PROCEDURE — G8400 PT W/DXA NO RESULTS DOC: HCPCS | Performed by: INTERNAL MEDICINE

## 2022-02-15 NOTE — PROGRESS NOTES
HISTORY OF PRESENT ILLNESS  Dufm Cheadle is a 78 y.o. female. Shortness of Breath  The history is provided by the patient. This is a chronic problem. The problem has been gradually improving. Pertinent negatives include no fever, no ear pain, no neck pain, no cough, no sputum production, no hemoptysis, no wheezing, no PND, no orthopnea, no vomiting, no rash, no leg swelling and no claudication. Associated medical issues include heart failure. Review of Systems   Constitutional: Negative for chills, diaphoresis, fever and weight loss. HENT: Negative for ear pain and hearing loss. Eyes: Negative for blurred vision. Respiratory: Negative for cough, hemoptysis, sputum production, wheezing and stridor. Cardiovascular: Negative for palpitations, orthopnea, claudication, leg swelling and PND. Gastrointestinal: Negative for heartburn, nausea and vomiting. Musculoskeletal: Negative for myalgias and neck pain. Skin: Negative for rash. Neurological: Negative for dizziness, tingling, tremors, focal weakness, loss of consciousness and weakness. Psychiatric/Behavioral: Negative for depression and suicidal ideas. No family history on file. Past Medical History:   Diagnosis Date    Hypothyroid     Obesity        Past Surgical History:   Procedure Laterality Date    HX OTHER SURGICAL  2016    neck lift        Social History     Tobacco Use    Smoking status: Never Smoker    Smokeless tobacco: Never Used   Substance Use Topics    Alcohol use: Not Currently       No Known Allergies    Outpatient Medications Marked as Taking for the 2/15/22 encounter (Office Visit) with Ba Hinds MD   Medication Sig Dispense Refill    carvediloL (COREG) 3.125 mg tablet TAKE 1 TAB BY MOUTH TWICE DAILY WITH MEALS. FOR CHRONIC HEART FAILURE, HIGH BLOOD PRESSURE 180 Tablet 3    sacubitriL-valsartan (Entresto) 49-51 mg tab tablet Take 1 Tab by mouth two (2) times a day.  60 Tab 5    aspirin delayed-release 81 mg tablet Take 1 Tab by mouth daily. Indications: treatment to prevent a heart attack 90 Tab 1    estradioL (VIVELLE) 0.05 mg/24 hr APPLY 1 PATCH ON THE SKIN TWICE A WEEK      progesterone (PROMETRIUM) 200 mg capsule Take 300 mg by mouth daily.  traZODone (DESYREL) 50 mg tablet TAKE 1 TABLET BY MOUTH ONCE DAILY AT BEDTIME      NP Thyroid 60 mg tablet TAKE 1 TABLET BY MOUTH ONCE DAILY IN THE MORNING          Visit Vitals  BP (!) 139/51   Pulse (!) 53   Wt 107.5 kg (237 lb)   BMI 43.35 kg/m²       Physical Exam  Constitutional:       Appearance: She is well-developed. HENT:      Head: Normocephalic and atraumatic. Eyes:      General: No scleral icterus. Conjunctiva/sclera: Conjunctivae normal.   Neck:      Vascular: No JVD. Cardiovascular:      Rate and Rhythm: Normal rate and regular rhythm. Heart sounds: Normal heart sounds. No murmur heard. No friction rub. No gallop. Pulmonary:      Effort: Pulmonary effort is normal. No respiratory distress. Breath sounds: Normal breath sounds. No stridor. No wheezing or rales. Chest:      Chest wall: No tenderness. Abdominal:      General: There is no distension. Tenderness: There is no abdominal tenderness. Musculoskeletal:         General: No tenderness. Normal range of motion. Cervical back: Normal range of motion and neck supple. Lymphadenopathy:      Cervical: No cervical adenopathy. Skin:     Findings: No erythema or rash. Neurological:      Mental Status: She is alert and oriented to person, place, and time. Cranial Nerves: No cranial nerve deficit. Psychiatric:         Behavior: Behavior normal.       08/16/20   ECHO ADULT FOLLOW-UP OR LIMITED 08/18/2020 8/18/2020    Narrative · LV: Estimated LVEF is <15%. Visually measured ejection fraction. Normal   wall thickness. Moderately dilated left ventricle. Severely reduced   systolic function.   · TV: Mild tricuspid valve regurgitation is present. · MV: Mild to moderate mitral valve regurgitation is present. · AV: Mild aortic valve regurgitation is present. · PA: Mild pulmonary hypertension. Pulmonary arterial systolic pressure is   41 mmHg. · COVID r/o  · RV: Mildly dilated right ventricle. Signed by: Adolph Chinchilla MD     08/16/20   CARDIAC PROCEDURE 08/24/2020 8/24/2020    Narrative Non obstructive epicardial coronary artery disease with known severe LV   dysfunction. Continue intense medical management. Will obtain lifevest as outpatient. If EF remains low inspite of optimal therapy, may need ICD. Signed by: Jayne Rush MD     10/23/20   ECHO ADULT COMPLETE 10/23/2020 10/23/2020    Narrative · LV: Estimated LVEF is 15 - 20%. Normal wall thickness. Dilated left   ventricle. Severely and globally reduced systolic function. Inconclusive   left ventricular diastolic function. The findings are consistent with   dilated cardiomyopathy. · LA: Moderately dilated left atrium. Left Atrium volume index is 45.57   mL/m2. · RV: Mildly dilated right ventricle. · MV: Mild mitral valve regurgitation is present. · AV: Mild aortic valve regurgitation is present. · TV: Mild tricuspid valve regurgitation is present. · AO: Mild ascending aorta dilatation. Ascending aorta diameter = 4 cm. · PA: Pulmonary arterial systolic pressure is 25 mmHg. Signed by: Jayne Rush MD   05/26/21    ECHO ADULT FOLLOW-UP OR LIMITED 05/28/2021 5/28/2021    Interpretation Summary  · LV: Estimated LVEF is 40 - 45%. Normal cavity size. Mild concentric hypertrophy. Mild hypokinesis of the basal inferoseptal, mid inferoseptal, basal inferior, mid inferior, basal anteroseptal and mid anteroseptal wall(s). Moderate (grade 2) left ventricular diastolic dysfunction. · LA: Moderately dilated left atrium. Left Atrium volume index is 43.96 mL/m2. · RV: Mildly dilated right ventricle. · RA: Mildly dilated right atrium.   · MV: Mild mitral valve regurgitation is present. · TV: Mild tricuspid valve regurgitation is present. Right Ventricular Arterial Pressure (RVSP) is 32 mmHg. Pulmonary hypertension not suggested by Doppler findings. · AO: Mild aortic root and ascending aorta dilatation. Aortic root diameter = 3.7 cm. Ascending aorta diameter = 4.1 cm. Signed by: Susie Herzog MD on 5/28/2021  3:52 PM  01/19/22    ECHO ADULT COMPLETE 01/20/2022 1/20/2022    Interpretation Summary    Left Ventricle: Left ventricle size is normal. Mildly increased wall thickness. Mild hypokinesis of the following segments: basal anteroseptal, basal inferior, basal inferoseptal, mid anteroseptal and mid inferior. Mildly reduced left ventricular systolic function with a visually estimated EF of 40 - 45%. Grade II diastolic dysfunction with increased LAP.   Mitral Valve: Mild transvalvular regurgitation.   Left Atrium: Left atrium is mildly dilated. LA Vol Index A/L is 44 mL/m2.   Right Atrium: Right atrium is mildly dilated.   Pulmonary Arteries: Pulmonary hypertension not present. The estimated pulmonary artery systolic pressure is 26 mmHg.   Aorta: Mildly dilated sinus of Valsalva. (3.9 cm) Mildly dilated ascending aorta. (4.2 cm)    Signed by: Susie Herzog MD on 1/20/2022 10:48 AM      ASSESSMENT and PLAN    ICD-10-CM ICD-9-CM    1. Systolic CHF, chronic (HCC)  I50.22 428.22      428.0    2. Essential hypertension  I10 401.9 AMB POC EKG ROUTINE W/ 12 LEADS, INTER & REP   3. Non-ischemic cardiomyopathy (HCC)  I42.8 425.4    4. Dyslipidemia  E78.5 272.4      Orders Placed This Encounter    AMB POC EKG ROUTINE W/ 12 LEADS, INTER & REP     Order Specific Question:   Reason for Exam:     Answer:   routine       Patient is a 70-year-old female recently admitted with acute systolic CHF. Subsequent coronary angiogram revealed patent epicardial coronary arteries. Patient with history of HFrEF stage C NYHA class II-on Coreg and Entresto. Repeat echo revealed EF 40-45%  No indication for ICD at this time. Reports fatigue secondary to BB/ bradycardia. Recommend to continue current meds  F/u in 6 months.

## 2022-02-15 NOTE — PROGRESS NOTES
1. Have you been to the ER, urgent care clinic since your last visit? Hospitalized since your last visit?     no    2. Have you seen or consulted any other health care providers outside of the [address] [place] [address] since your last visit? Include any pap smears or colon screening.       No

## 2022-02-21 RX ORDER — SACUBITRIL AND VALSARTAN 49; 51 MG/1; MG/1
TABLET, FILM COATED ORAL
Qty: 60 TABLET | Refills: 2 | Status: SHIPPED | OUTPATIENT
Start: 2022-02-21 | End: 2022-05-25 | Stop reason: SDUPTHER

## 2022-03-18 PROBLEM — R77.8 ELEVATED TROPONIN: Status: ACTIVE | Noted: 2020-08-18

## 2022-03-18 PROBLEM — R79.89 ELEVATED TROPONIN: Status: ACTIVE | Noted: 2020-08-18

## 2022-03-18 PROBLEM — Z20.822 SUSPECTED COVID-19 VIRUS INFECTION: Status: ACTIVE | Noted: 2020-08-18

## 2022-03-19 PROBLEM — I42.9 CARDIOMYOPATHY (HCC): Status: ACTIVE | Noted: 2020-08-18

## 2022-03-19 PROBLEM — I50.9 ACUTE CHF (CONGESTIVE HEART FAILURE) (HCC): Status: ACTIVE | Noted: 2020-08-16

## 2022-03-19 PROBLEM — J18.9 CAP (COMMUNITY ACQUIRED PNEUMONIA): Status: ACTIVE | Noted: 2020-08-18

## 2022-05-20 DIAGNOSIS — E78.5 DYSLIPIDEMIA: ICD-10-CM

## 2022-05-25 DIAGNOSIS — E78.5 DYSLIPIDEMIA: Primary | ICD-10-CM

## 2022-05-25 RX ORDER — SACUBITRIL AND VALSARTAN 49; 51 MG/1; MG/1
1 TABLET, FILM COATED ORAL 2 TIMES DAILY
Qty: 60 TABLET | Refills: 3 | Status: SHIPPED | OUTPATIENT
Start: 2022-05-25 | End: 2022-05-31 | Stop reason: SDUPTHER

## 2022-05-25 NOTE — TELEPHONE ENCOUNTER
Refill Entresto 49-51 mg one twice a day # 60 ( was patient of Dr Lonni Hodgkin and won't see her new cardiologist until August )

## 2022-05-31 RX ORDER — SACUBITRIL AND VALSARTAN 49; 51 MG/1; MG/1
1 TABLET, FILM COATED ORAL 2 TIMES DAILY
Qty: 60 TABLET | Refills: 3 | Status: SHIPPED | OUTPATIENT
Start: 2022-05-31

## 2022-05-31 RX ORDER — SACUBITRIL AND VALSARTAN 49; 51 MG/1; MG/1
TABLET, FILM COATED ORAL
Qty: 60 TABLET | Refills: 2 | OUTPATIENT
Start: 2022-05-31

## 2023-02-27 DIAGNOSIS — E78.5 HYPERLIPIDEMIA, UNSPECIFIED: ICD-10-CM

## 2023-02-27 RX ORDER — SACUBITRIL AND VALSARTAN 49; 51 MG/1; MG/1
TABLET, FILM COATED ORAL
Qty: 60 TABLET | Refills: 0 | Status: SHIPPED | OUTPATIENT
Start: 2023-02-27

## 2023-04-08 ENCOUNTER — HOSPITAL ENCOUNTER (EMERGENCY)
Facility: HOSPITAL | Age: 68
Discharge: HOME | End: 2023-04-08
Attending: STUDENT IN AN ORGANIZED HEALTH CARE EDUCATION/TRAINING PROGRAM
Payer: MEDICARE

## 2023-04-08 ENCOUNTER — APPOINTMENT (OUTPATIENT)
Facility: HOSPITAL | Age: 68
End: 2023-04-08
Payer: MEDICARE

## 2023-04-08 VITALS
WEIGHT: 207 LBS | SYSTOLIC BLOOD PRESSURE: 104 MMHG | RESPIRATION RATE: 16 BRPM | HEIGHT: 62 IN | TEMPERATURE: 97.1 F | BODY MASS INDEX: 38.09 KG/M2 | DIASTOLIC BLOOD PRESSURE: 59 MMHG | OXYGEN SATURATION: 100 % | HEART RATE: 100 BPM

## 2023-04-08 DIAGNOSIS — K80.20 CALCULUS OF GALLBLADDER WITHOUT CHOLECYSTITIS WITHOUT OBSTRUCTION: ICD-10-CM

## 2023-04-08 DIAGNOSIS — R10.13 EPIGASTRIC PAIN: Primary | ICD-10-CM

## 2023-04-08 DIAGNOSIS — K20.90 ESOPHAGITIS: ICD-10-CM

## 2023-04-08 DIAGNOSIS — K44.9 HIATAL HERNIA: ICD-10-CM

## 2023-04-08 LAB
ALBUMIN SERPL-MCNC: 3.5 G/DL (ref 3.4–5)
ALBUMIN/GLOB SERPL: 1.1 (ref 0.8–1.7)
ALP SERPL-CCNC: 55 U/L (ref 45–117)
ALT SERPL-CCNC: 14 U/L (ref 13–56)
ANION GAP SERPL CALC-SCNC: 11 MMOL/L (ref 3–18)
APPEARANCE UR: ABNORMAL
AST SERPL-CCNC: 14 U/L (ref 10–38)
BACTERIA URNS QL MICRO: ABNORMAL /HPF
BASOPHILS # BLD: 0 K/UL (ref 0–0.1)
BASOPHILS NFR BLD: 1 % (ref 0–2)
BILIRUB SERPL-MCNC: 0.9 MG/DL (ref 0.2–1)
BILIRUB UR QL: ABNORMAL
BUN SERPL-MCNC: 23 MG/DL (ref 7–18)
BUN/CREAT SERPL: 40 (ref 12–20)
CALCIUM SERPL-MCNC: 9.4 MG/DL (ref 8.5–10.1)
CHLORIDE SERPL-SCNC: 108 MMOL/L (ref 100–111)
CO2 SERPL-SCNC: 20 MMOL/L (ref 21–32)
COLOR UR: ABNORMAL
CREAT SERPL-MCNC: 0.58 MG/DL (ref 0.6–1.3)
DIFFERENTIAL METHOD BLD: ABNORMAL
EKG ATRIAL RATE: 81 BPM
EKG DIAGNOSIS: NORMAL
EKG P AXIS: 25 DEGREES
EKG P-R INTERVAL: 160 MS
EKG Q-T INTERVAL: 390 MS
EKG QRS DURATION: 136 MS
EKG QTC CALCULATION (BAZETT): 453 MS
EKG R AXIS: -21 DEGREES
EKG T AXIS: 157 DEGREES
EKG VENTRICULAR RATE: 81 BPM
EOSINOPHIL # BLD: 0 K/UL (ref 0–0.4)
EOSINOPHIL NFR BLD: 0 % (ref 0–5)
EPITH CASTS URNS QL MICRO: ABNORMAL /LPF (ref 0–5)
ERYTHROCYTE [DISTWIDTH] IN BLOOD BY AUTOMATED COUNT: 14.4 % (ref 11.6–14.5)
GLOBULIN SER CALC-MCNC: 3.2 G/DL (ref 2–4)
GLUCOSE SERPL-MCNC: 111 MG/DL (ref 74–99)
GLUCOSE UR STRIP.AUTO-MCNC: NEGATIVE MG/DL
HCT VFR BLD AUTO: 43.2 % (ref 35–45)
HGB BLD-MCNC: 14.6 G/DL (ref 12–16)
HGB UR QL STRIP: NEGATIVE
HYALINE CASTS URNS QL MICRO: ABNORMAL /LPF (ref 0–2)
IMM GRANULOCYTES # BLD AUTO: 0 K/UL (ref 0–0.04)
IMM GRANULOCYTES NFR BLD AUTO: 0 % (ref 0–0.5)
KETONES UR QL STRIP.AUTO: 80 MG/DL
LEUKOCYTE ESTERASE UR QL STRIP.AUTO: ABNORMAL
LIPASE SERPL-CCNC: 202 U/L (ref 73–393)
LYMPHOCYTES # BLD: 0.9 K/UL (ref 0.9–3.6)
LYMPHOCYTES NFR BLD: 15 % (ref 21–52)
MCH RBC QN AUTO: 29.1 PG (ref 24–34)
MCHC RBC AUTO-ENTMCNC: 33.8 G/DL (ref 31–37)
MCV RBC AUTO: 86.2 FL (ref 78–100)
MONOCYTES # BLD: 0.5 K/UL (ref 0.05–1.2)
MONOCYTES NFR BLD: 9 % (ref 3–10)
MUCOUS THREADS URNS QL MICRO: ABNORMAL /LPF
NEUTS SEG # BLD: 4.4 K/UL (ref 1.8–8)
NEUTS SEG NFR BLD: 75 % (ref 40–73)
NITRITE UR QL STRIP.AUTO: NEGATIVE
NRBC # BLD: 0 K/UL (ref 0–0.01)
NRBC BLD-RTO: 0 PER 100 WBC
PH UR STRIP: 5.5 (ref 5–8)
PLATELET # BLD AUTO: 229 K/UL (ref 135–420)
PMV BLD AUTO: 10.4 FL (ref 9.2–11.8)
POTASSIUM SERPL-SCNC: 3.3 MMOL/L (ref 3.5–5.5)
PROT SERPL-MCNC: 6.7 G/DL (ref 6.4–8.2)
PROT UR STRIP-MCNC: 100 MG/DL
RBC # BLD AUTO: 5.01 M/UL (ref 4.2–5.3)
RBC #/AREA URNS HPF: ABNORMAL /HPF (ref 0–5)
SODIUM SERPL-SCNC: 139 MMOL/L (ref 136–145)
SP GR UR REFRACTOMETRY: >1.03 (ref 1–1.03)
UROBILINOGEN UR QL STRIP.AUTO: 1 EU/DL (ref 0.2–1)
WBC # BLD AUTO: 5.9 K/UL (ref 4.6–13.2)
WBC URNS QL MICRO: ABNORMAL /HPF (ref 0–5)

## 2023-04-08 PROCEDURE — 83690 ASSAY OF LIPASE: CPT

## 2023-04-08 PROCEDURE — 2580000003 HC RX 258: Performed by: STUDENT IN AN ORGANIZED HEALTH CARE EDUCATION/TRAINING PROGRAM

## 2023-04-08 PROCEDURE — 81001 URINALYSIS AUTO W/SCOPE: CPT

## 2023-04-08 PROCEDURE — 80053 COMPREHEN METABOLIC PANEL: CPT

## 2023-04-08 PROCEDURE — 6370000000 HC RX 637 (ALT 250 FOR IP): Performed by: STUDENT IN AN ORGANIZED HEALTH CARE EDUCATION/TRAINING PROGRAM

## 2023-04-08 PROCEDURE — 93005 ELECTROCARDIOGRAM TRACING: CPT | Performed by: STUDENT IN AN ORGANIZED HEALTH CARE EDUCATION/TRAINING PROGRAM

## 2023-04-08 PROCEDURE — 2500000003 HC RX 250 WO HCPCS: Performed by: STUDENT IN AN ORGANIZED HEALTH CARE EDUCATION/TRAINING PROGRAM

## 2023-04-08 PROCEDURE — 6360000002 HC RX W HCPCS: Performed by: STUDENT IN AN ORGANIZED HEALTH CARE EDUCATION/TRAINING PROGRAM

## 2023-04-08 PROCEDURE — 85025 COMPLETE CBC W/AUTO DIFF WBC: CPT

## 2023-04-08 PROCEDURE — 87086 URINE CULTURE/COLONY COUNT: CPT

## 2023-04-08 PROCEDURE — A4216 STERILE WATER/SALINE, 10 ML: HCPCS | Performed by: STUDENT IN AN ORGANIZED HEALTH CARE EDUCATION/TRAINING PROGRAM

## 2023-04-08 PROCEDURE — 76705 ECHO EXAM OF ABDOMEN: CPT

## 2023-04-08 PROCEDURE — 87147 CULTURE TYPE IMMUNOLOGIC: CPT

## 2023-04-08 RX ORDER — MAGNESIUM HYDROXIDE/ALUMINUM HYDROXICE/SIMETHICONE 120; 1200; 1200 MG/30ML; MG/30ML; MG/30ML
30 SUSPENSION ORAL
Status: COMPLETED | OUTPATIENT
Start: 2023-04-08 | End: 2023-04-08

## 2023-04-08 RX ORDER — KETOROLAC TROMETHAMINE 15 MG/ML
15 INJECTION, SOLUTION INTRAMUSCULAR; INTRAVENOUS ONCE
Status: COMPLETED | OUTPATIENT
Start: 2023-04-08 | End: 2023-04-08

## 2023-04-08 RX ORDER — PROMETHAZINE HYDROCHLORIDE 12.5 MG/1
12.5 TABLET ORAL 4 TIMES DAILY PRN
Qty: 20 TABLET | Refills: 0 | Status: SHIPPED | OUTPATIENT
Start: 2023-04-08 | End: 2023-04-22

## 2023-04-08 RX ORDER — ONDANSETRON 2 MG/ML
4 INJECTION INTRAMUSCULAR; INTRAVENOUS ONCE
Status: COMPLETED | OUTPATIENT
Start: 2023-04-08 | End: 2023-04-08

## 2023-04-08 RX ORDER — SODIUM CHLORIDE, SODIUM LACTATE, POTASSIUM CHLORIDE, AND CALCIUM CHLORIDE .6; .31; .03; .02 G/100ML; G/100ML; G/100ML; G/100ML
1000 INJECTION, SOLUTION INTRAVENOUS ONCE
Status: COMPLETED | OUTPATIENT
Start: 2023-04-08 | End: 2023-04-08

## 2023-04-08 RX ORDER — DICYCLOMINE HYDROCHLORIDE 10 MG/1
10 CAPSULE ORAL 4 TIMES DAILY
Qty: 120 CAPSULE | Refills: 0 | Status: SHIPPED | OUTPATIENT
Start: 2023-04-08

## 2023-04-08 RX ORDER — PANTOPRAZOLE SODIUM 20 MG/1
20 TABLET, DELAYED RELEASE ORAL
Qty: 30 TABLET | Refills: 0 | Status: SHIPPED | OUTPATIENT
Start: 2023-04-08

## 2023-04-08 RX ADMIN — KETOROLAC TROMETHAMINE 15 MG: 15 INJECTION, SOLUTION INTRAMUSCULAR; INTRAVENOUS at 10:49

## 2023-04-08 RX ADMIN — SODIUM CHLORIDE, POTASSIUM CHLORIDE, SODIUM LACTATE AND CALCIUM CHLORIDE 1000 ML: 600; 310; 30; 20 INJECTION, SOLUTION INTRAVENOUS at 10:49

## 2023-04-08 RX ADMIN — FAMOTIDINE 20 MG: 10 INJECTION, SOLUTION INTRAVENOUS at 10:48

## 2023-04-08 RX ADMIN — ONDANSETRON 4 MG: 2 INJECTION INTRAMUSCULAR; INTRAVENOUS at 10:49

## 2023-04-08 RX ADMIN — ALUMINUM HYDROXIDE, MAGNESIUM HYDROXIDE, AND SIMETHICONE 30 ML: 200; 200; 20 SUSPENSION ORAL at 10:48

## 2023-04-08 ASSESSMENT — ENCOUNTER SYMPTOMS
ABDOMINAL PAIN: 1
VOMITING: 1
SHORTNESS OF BREATH: 0
NAUSEA: 1
DIARRHEA: 0

## 2023-04-08 NOTE — ED PROVIDER NOTES
115 Patient reports symptomatic improvement with medications given in the ED. Lab results reviewed and discussed with patient at bedside. Pending ultrasound of the right upper quadrant, likely discharge with GI referral. [NN]      ED Course User Index  [NN] Ortiz DO [place]          Disposition     DISPOSITION Decision To Discharge 04/08/2023 02:48:33 PM    DISCHARGE NOTE:    Jose Francisco Ruiz's  results have been reviewed with her. She has been counseled regarding her diagnosis, treatment, and plan. She verbally conveys understanding and agreement of the signs, symptoms, diagnosis, treatment and prognosis and additionally agrees to follow up as discussed. She also agrees with the care-plan and conveys that all of her questions have been answered. I have also provided discharge instructions for her that include: educational information regarding their diagnosis and treatment, and list of reasons why they would want to return to the ED prior to their follow-up appointment, should her condition change. She has been provided with education for proper emergency department utilization. Diagnosis     CLINICAL IMPRESSION:    1. Epigastric pain    2. Esophagitis    3. Hiatal hernia    4. Calculus of gallbladder without cholecystitis without obstruction        PLAN:  1. D/C Home  2. New Prescriptions    DICYCLOMINE (BENTYL) 10 MG CAPSULE    Take 1 capsule by mouth 4 times daily    PANTOPRAZOLE (PROTONIX) 20 MG TABLET    Take 1 tablet by mouth every morning (before breakfast)    PROMETHAZINE (PHENERGAN) 12.5 MG TABLET    Take 1 tablet by mouth 4 times daily as needed for Nausea      3.  Blanca Whitley MD  [address] [facility] [phone]    Schedule an appointment as soon as possible for a visit in 1 week      [address] [id number] [phone]    Schedule an appointment as soon as possible for a visit in 1 week      [facility]

## 2023-04-08 NOTE — ED TRIAGE NOTES
Patient ambulatory to ED c/o abdominal pain and loss of appetite. Pt states she had CT abdomen with IV and oral contrast on Thursday resulting in hiatal hernia, calcified gallstones, thickening of distal esophagus, hepatic steatosis. CT was ordered by PCP. Hx CHF, gastroenteritis     Denies any past abdominal surgeries.

## 2023-04-08 NOTE — DISCHARGE INSTRUCTIONS
You may take Tylenol 1 g every 6 hours and/or Motrin 800 mg every 8 hours as needed for pain. Please take your prescribed medications as directed. Monitor symptoms closely at home. Return to the ER immediately for any new or worsening symptoms such as worsening pain, jaundice, inability to tolerate any food or water, or any other concerning symptoms.

## 2023-04-09 LAB
BACTERIA SPEC CULT: ABNORMAL
CC UR VC: ABNORMAL
SERVICE CMNT-IMP: ABNORMAL

## 2023-05-16 ENCOUNTER — HOSPITAL ENCOUNTER (OUTPATIENT)
Facility: HOSPITAL | Age: 68
Discharge: HOME | End: 2023-05-19
Payer: MEDICARE

## 2023-05-16 DIAGNOSIS — K44.9 DIAPHRAGMATIC HERNIA WITHOUT OBSTRUCTION OR GANGRENE: ICD-10-CM

## 2023-05-16 PROCEDURE — 74240 X-RAY XM UPR GI TRC 1CNTRST: CPT

## 2023-05-16 PROCEDURE — 6370000000 HC RX 637 (ALT 250 FOR IP): Performed by: SURGERY

## 2023-05-16 PROCEDURE — 2500000003 HC RX 250 WO HCPCS: Performed by: SURGERY

## 2023-05-16 RX ADMIN — ANTACID/ANTIFLATULENT 1 EACH: 380; 550; 10; 10 GRANULE, EFFERVESCENT ORAL at 11:13

## 2023-05-16 RX ADMIN — BARIUM SULFATE 176 ML: 960 POWDER, FOR SUSPENSION ORAL at 11:14

## 2024-03-06 ENCOUNTER — HOSPITAL ENCOUNTER (OUTPATIENT)
Facility: HOSPITAL | Age: 69
Discharge: HOME | End: 2024-03-09
Payer: MEDICARE

## 2024-03-06 ENCOUNTER — TRANSCRIBE ORDERS (OUTPATIENT)
Facility: HOSPITAL | Age: 69
End: 2024-03-06

## 2024-03-06 DIAGNOSIS — I44.7 LEFT BUNDLE-BRANCH BLOCK, UNSPECIFIED: ICD-10-CM

## 2024-03-06 DIAGNOSIS — I50.22 CHRONIC SYSTOLIC CONGESTIVE HEART FAILURE (HCC): Primary | ICD-10-CM

## 2024-03-06 DIAGNOSIS — I50.22 CHRONIC SYSTOLIC CONGESTIVE HEART FAILURE (HCC): ICD-10-CM

## 2024-03-06 LAB
ALBUMIN SERPL-MCNC: 4 G/DL (ref 3.4–5)
ALBUMIN/GLOB SERPL: 1.5 (ref 0.8–1.7)
ALP SERPL-CCNC: 56 U/L (ref 45–117)
ALT SERPL-CCNC: 18 U/L (ref 13–56)
ANION GAP SERPL CALC-SCNC: 6 MMOL/L (ref 3–18)
AST SERPL-CCNC: 10 U/L (ref 10–38)
BILIRUB SERPL-MCNC: 0.6 MG/DL (ref 0.2–1)
BUN SERPL-MCNC: 15 MG/DL (ref 7–18)
BUN/CREAT SERPL: 20 (ref 12–20)
CALCIUM SERPL-MCNC: 9.4 MG/DL (ref 8.5–10.1)
CHLORIDE SERPL-SCNC: 114 MMOL/L (ref 100–111)
CHOLEST SERPL-MCNC: 171 MG/DL
CO2 SERPL-SCNC: 22 MMOL/L (ref 21–32)
CREAT SERPL-MCNC: 0.76 MG/DL (ref 0.6–1.3)
FAX TO NUMBER: NORMAL
GLOBULIN SER CALC-MCNC: 2.7 G/DL (ref 2–4)
GLUCOSE SERPL-MCNC: 95 MG/DL (ref 74–99)
HDLC SERPL-MCNC: 46 MG/DL (ref 40–60)
HDLC SERPL: 3.7 (ref 0–5)
LDLC SERPL CALC-MCNC: 107.2 MG/DL (ref 0–100)
LIPID PANEL: ABNORMAL
POTASSIUM SERPL-SCNC: 4 MMOL/L (ref 3.5–5.5)
PROT SERPL-MCNC: 6.7 G/DL (ref 6.4–8.2)
SODIUM SERPL-SCNC: 142 MMOL/L (ref 136–145)
TEST RESULTS FAXED TO: NORMAL
TRIGL SERPL-MCNC: 89 MG/DL
VLDLC SERPL CALC-MCNC: 17.8 MG/DL

## 2024-03-06 PROCEDURE — 36415 COLL VENOUS BLD VENIPUNCTURE: CPT

## 2024-03-06 PROCEDURE — 80061 LIPID PANEL: CPT

## 2024-03-06 PROCEDURE — 80053 COMPREHEN METABOLIC PANEL: CPT

## 2024-04-03 ENCOUNTER — ANESTHESIA EVENT (OUTPATIENT)
Facility: HOSPITAL | Age: 69
End: 2024-04-03
Payer: MEDICARE

## 2024-04-03 ENCOUNTER — HOSPITAL ENCOUNTER (OUTPATIENT)
Facility: HOSPITAL | Age: 69
Setting detail: OUTPATIENT SURGERY
Discharge: HOME | End: 2024-04-03
Attending: OPHTHALMOLOGY | Admitting: OPHTHALMOLOGY
Payer: MEDICARE

## 2024-04-03 ENCOUNTER — ANESTHESIA (OUTPATIENT)
Facility: HOSPITAL | Age: 69
End: 2024-04-03
Payer: MEDICARE

## 2024-04-03 VITALS
DIASTOLIC BLOOD PRESSURE: 88 MMHG | OXYGEN SATURATION: 99 % | TEMPERATURE: 97.8 F | WEIGHT: 187.4 LBS | SYSTOLIC BLOOD PRESSURE: 139 MMHG | RESPIRATION RATE: 16 BRPM | BODY MASS INDEX: 34.48 KG/M2 | HEIGHT: 62 IN | HEART RATE: 63 BPM

## 2024-04-03 PROBLEM — H26.9 CATARACT: Status: ACTIVE | Noted: 2024-04-03

## 2024-04-03 PROCEDURE — 6370000000 HC RX 637 (ALT 250 FOR IP): Performed by: OPHTHALMOLOGY

## 2024-04-03 PROCEDURE — 7100000010 HC PHASE II RECOVERY - FIRST 15 MIN: Performed by: OPHTHALMOLOGY

## 2024-04-03 PROCEDURE — 3700000001 HC ADD 15 MINUTES (ANESTHESIA): Performed by: OPHTHALMOLOGY

## 2024-04-03 PROCEDURE — 6360000002 HC RX W HCPCS: Performed by: NURSE ANESTHETIST, CERTIFIED REGISTERED

## 2024-04-03 PROCEDURE — 2500000003 HC RX 250 WO HCPCS: Performed by: NURSE ANESTHETIST, CERTIFIED REGISTERED

## 2024-04-03 PROCEDURE — 3600000012 HC SURGERY LEVEL 2 ADDTL 15MIN: Performed by: OPHTHALMOLOGY

## 2024-04-03 PROCEDURE — 2709999900 HC NON-CHARGEABLE SUPPLY: Performed by: OPHTHALMOLOGY

## 2024-04-03 PROCEDURE — 2500000003 HC RX 250 WO HCPCS: Performed by: OPHTHALMOLOGY

## 2024-04-03 PROCEDURE — V2632 POST CHMBR INTRAOCULAR LENS: HCPCS | Performed by: OPHTHALMOLOGY

## 2024-04-03 PROCEDURE — 7100000001 HC PACU RECOVERY - ADDTL 15 MIN: Performed by: OPHTHALMOLOGY

## 2024-04-03 PROCEDURE — 3600000002 HC SURGERY LEVEL 2 BASE: Performed by: OPHTHALMOLOGY

## 2024-04-03 PROCEDURE — 2720000010 HC SURG SUPPLY STERILE: Performed by: OPHTHALMOLOGY

## 2024-04-03 PROCEDURE — 7100000000 HC PACU RECOVERY - FIRST 15 MIN: Performed by: OPHTHALMOLOGY

## 2024-04-03 PROCEDURE — 2580000003 HC RX 258: Performed by: OPHTHALMOLOGY

## 2024-04-03 PROCEDURE — 6360000002 HC RX W HCPCS: Performed by: OPHTHALMOLOGY

## 2024-04-03 PROCEDURE — 3700000000 HC ANESTHESIA ATTENDED CARE: Performed by: OPHTHALMOLOGY

## 2024-04-03 RX ORDER — PHENYLEPHRINE HYDROCHLORIDE 25 MG/ML
1 SOLUTION/ DROPS OPHTHALMIC
Status: COMPLETED | OUTPATIENT
Start: 2024-04-03 | End: 2024-04-03

## 2024-04-03 RX ORDER — LIDOCAINE HYDROCHLORIDE 10 MG/ML
INJECTION, SOLUTION EPIDURAL; INFILTRATION; INTRACAUDAL; PERINEURAL PRN
Status: DISCONTINUED | OUTPATIENT
Start: 2024-04-03 | End: 2024-04-03 | Stop reason: ALTCHOICE

## 2024-04-03 RX ORDER — FENTANYL CITRATE 50 UG/ML
INJECTION, SOLUTION INTRAMUSCULAR; INTRAVENOUS PRN
Status: DISCONTINUED | OUTPATIENT
Start: 2024-04-03 | End: 2024-04-03 | Stop reason: SDUPTHER

## 2024-04-03 RX ORDER — BALANCED SALT SOLUTION 6.4; .75; .48; .3; 3.9; 1.7 MG/ML; MG/ML; MG/ML; MG/ML; MG/ML; MG/ML
SOLUTION OPHTHALMIC PRN
Status: DISCONTINUED | OUTPATIENT
Start: 2024-04-03 | End: 2024-04-03 | Stop reason: ALTCHOICE

## 2024-04-03 RX ORDER — EPINEPHRINE 1 MG/ML
INJECTION, SOLUTION, CONCENTRATE INTRAVENOUS PRN
Status: DISCONTINUED | OUTPATIENT
Start: 2024-04-03 | End: 2024-04-03 | Stop reason: ALTCHOICE

## 2024-04-03 RX ORDER — SODIUM CHLORIDE, SODIUM LACTATE, POTASSIUM CHLORIDE, CALCIUM CHLORIDE 600; 310; 30; 20 MG/100ML; MG/100ML; MG/100ML; MG/100ML
INJECTION, SOLUTION INTRAVENOUS CONTINUOUS
Status: DISCONTINUED | OUTPATIENT
Start: 2024-04-03 | End: 2024-04-03 | Stop reason: HOSPADM

## 2024-04-03 RX ORDER — NALOXONE HYDROCHLORIDE 0.4 MG/ML
INJECTION, SOLUTION INTRAMUSCULAR; INTRAVENOUS; SUBCUTANEOUS PRN
Status: DISCONTINUED | OUTPATIENT
Start: 2024-04-03 | End: 2024-04-03 | Stop reason: HOSPADM

## 2024-04-03 RX ORDER — SODIUM CHLORIDE, POTASSIUM CHLORIDE, CALCIUM CHLORIDE, MAGNESIUM CHLORIDE, SODIUM ACETATE, AND SODIUM CITRATE 6.4; .75; .48; .3; 3.9; 1.7 MG/ML; MG/ML; MG/ML; MG/ML; MG/ML; MG/ML
SOLUTION IRRIGATION PRN
Status: DISCONTINUED | OUTPATIENT
Start: 2024-04-03 | End: 2024-04-03 | Stop reason: ALTCHOICE

## 2024-04-03 RX ORDER — TROPICAMIDE 10 MG/ML
1 SOLUTION/ DROPS OPHTHALMIC
Status: COMPLETED | OUTPATIENT
Start: 2024-04-03 | End: 2024-04-03

## 2024-04-03 RX ORDER — SODIUM CHLORIDE 0.9 % (FLUSH) 0.9 %
5-40 SYRINGE (ML) INJECTION PRN
Status: DISCONTINUED | OUTPATIENT
Start: 2024-04-03 | End: 2024-04-03 | Stop reason: HOSPADM

## 2024-04-03 RX ORDER — EPHEDRINE SULFATE/0.9% NACL/PF 50 MG/5 ML
SYRINGE (ML) INTRAVENOUS PRN
Status: DISCONTINUED | OUTPATIENT
Start: 2024-04-03 | End: 2024-04-03 | Stop reason: SDUPTHER

## 2024-04-03 RX ORDER — OFLOXACIN 3 MG/ML
1 SOLUTION/ DROPS OPHTHALMIC PRN
Status: DISCONTINUED | OUTPATIENT
Start: 2024-04-03 | End: 2024-04-03 | Stop reason: HOSPADM

## 2024-04-03 RX ORDER — SODIUM CHLORIDE 0.9 % (FLUSH) 0.9 %
5-40 SYRINGE (ML) INJECTION EVERY 12 HOURS SCHEDULED
Status: DISCONTINUED | OUTPATIENT
Start: 2024-04-03 | End: 2024-04-03 | Stop reason: HOSPADM

## 2024-04-03 RX ORDER — SODIUM CHLORIDE 9 MG/ML
INJECTION, SOLUTION INTRAVENOUS PRN
Status: DISCONTINUED | OUTPATIENT
Start: 2024-04-03 | End: 2024-04-03 | Stop reason: HOSPADM

## 2024-04-03 RX ORDER — PHENYLEPHRINE HCL IN 0.9% NACL 1 MG/10 ML
SYRINGE (ML) INTRAVENOUS PRN
Status: DISCONTINUED | OUTPATIENT
Start: 2024-04-03 | End: 2024-04-03 | Stop reason: SDUPTHER

## 2024-04-03 RX ORDER — MIDAZOLAM HYDROCHLORIDE 1 MG/ML
INJECTION INTRAMUSCULAR; INTRAVENOUS PRN
Status: DISCONTINUED | OUTPATIENT
Start: 2024-04-03 | End: 2024-04-03 | Stop reason: SDUPTHER

## 2024-04-03 RX ADMIN — MIDAZOLAM 1 MG: 1 INJECTION INTRAMUSCULAR; INTRAVENOUS at 10:03

## 2024-04-03 RX ADMIN — PHENYLEPHRINE HYDROCHLORIDE 1 DROP: 25 SOLUTION/ DROPS OPHTHALMIC at 08:43

## 2024-04-03 RX ADMIN — Medication 5 MG: at 10:13

## 2024-04-03 RX ADMIN — TROPICAMIDE 1 DROP: 10 SOLUTION/ DROPS OPHTHALMIC at 08:24

## 2024-04-03 RX ADMIN — FENTANYL CITRATE 50 MCG: 50 INJECTION, SOLUTION INTRAMUSCULAR; INTRAVENOUS at 10:00

## 2024-04-03 RX ADMIN — PHENYLEPHRINE HYDROCHLORIDE 1 DROP: 25 SOLUTION/ DROPS OPHTHALMIC at 08:24

## 2024-04-03 RX ADMIN — SODIUM CHLORIDE, POTASSIUM CHLORIDE, SODIUM LACTATE AND CALCIUM CHLORIDE: 600; 310; 30; 20 INJECTION, SOLUTION INTRAVENOUS at 11:26

## 2024-04-03 RX ADMIN — TROPICAMIDE 1 DROP: 10 SOLUTION/ DROPS OPHTHALMIC at 08:47

## 2024-04-03 RX ADMIN — PHENYLEPHRINE HYDROCHLORIDE 1 DROP: 25 SOLUTION/ DROPS OPHTHALMIC at 08:47

## 2024-04-03 RX ADMIN — OFLOXACIN 1 DROP: 3 SOLUTION OPHTHALMIC at 08:24

## 2024-04-03 RX ADMIN — TROPICAMIDE 1 DROP: 10 SOLUTION/ DROPS OPHTHALMIC at 08:28

## 2024-04-03 RX ADMIN — SODIUM CHLORIDE, POTASSIUM CHLORIDE, SODIUM LACTATE AND CALCIUM CHLORIDE: 600; 310; 30; 20 INJECTION, SOLUTION INTRAVENOUS at 08:44

## 2024-04-03 RX ADMIN — MIDAZOLAM 1 MG: 1 INJECTION INTRAMUSCULAR; INTRAVENOUS at 10:00

## 2024-04-03 RX ADMIN — FENTANYL CITRATE 50 MCG: 50 INJECTION, SOLUTION INTRAMUSCULAR; INTRAVENOUS at 10:03

## 2024-04-03 RX ADMIN — Medication 50 MCG: at 10:12

## 2024-04-03 RX ADMIN — PHENYLEPHRINE HYDROCHLORIDE 1 DROP: 25 SOLUTION/ DROPS OPHTHALMIC at 08:28

## 2024-04-03 RX ADMIN — Medication 5 MG: at 10:09

## 2024-04-03 RX ADMIN — TROPICAMIDE 1 DROP: 10 SOLUTION/ DROPS OPHTHALMIC at 08:43

## 2024-04-03 ASSESSMENT — PAIN SCALES - GENERAL: PAINLEVEL_OUTOF10: 0

## 2024-04-03 ASSESSMENT — PAIN - FUNCTIONAL ASSESSMENT: PAIN_FUNCTIONAL_ASSESSMENT: NONE - DENIES PAIN

## 2024-04-03 NOTE — PERIOP NOTE
Reviewed discharge plan of care with patient and her friend. Written instructions provided as well. They verbalized understanding

## 2024-04-03 NOTE — INTERVAL H&P NOTE
Update History & Physical    The patient's History and Physical of April 3, 2024 was reviewed with the patient and I examined the patient. There was no change. The surgical site was confirmed by the patient and me.     Plan: The risks, benefits, expected outcome, and alternative to the recommended procedure have been discussed with the patient. Patient understands and wants to proceed with the procedure.     Electronically signed by RUKHSANA LOPEZ MD on 4/3/2024 at 10:15 AM

## 2024-04-03 NOTE — PERIOP NOTE
TRANSFER - IN REPORT:    Verbal report received from OR RN on Kaylie Ruiz  being received from OR for routine post-op      Report consisted of patient's Situation, Background, Assessment and   Recommendations(SBAR).     Information from the following report(s) Adult Overview, Surgery Report, Intake/Output, and MAR was reviewed with the receiving nurse.    Opportunity for questions and clarification was provided.      Assessment completed upon patient's arrival to unit and care assumed.

## 2024-04-03 NOTE — ANESTHESIA POSTPROCEDURE EVALUATION
Department of Anesthesiology  Postprocedure Note    Patient: Kaylie Ruiz  MRN: [id number]  YOB: 1955  Date of evaluation: 4/3/2024    Procedure Summary       Date: 04/03/24 Room / Location: [facility] MAIN 02 / [facility] MAIN OR    Anesthesia Start: 0956 Anesthesia Stop: 1020    Procedure: CATARACT EXTRACTION WITH INTRAOCULAR LENS IMPLANT LEFT EYE (Left: Eye) Diagnosis:       Cataract, nuclear sclerotic, left eye      (Cataract, nuclear sclerotic, left eye [H25.12])    Surgeons: Nathaniel Saleh MD Responsible Provider: Graham Pratt MD    Anesthesia Type: MAC ASA Status: 3            Anesthesia Type: MAC    Fay Phase I: Fay Score: 10    Fay Phase II:      Anesthesia Post Evaluation    Patient location during evaluation: PACU  Patient participation: complete - patient participated  Level of consciousness: awake and alert  Pain score: 0  Airway patency: patent  Nausea & Vomiting: no nausea and no vomiting  Cardiovascular status: blood pressure returned to baseline  Respiratory status: acceptable  Hydration status: euvolemic  Multimodal analgesia pain management approach  Pain management: adequate    No notable events documented.

## 2024-04-03 NOTE — ANESTHESIA PRE PROCEDURE
Date/Time     03/06/2024 10:43 AM    K 4.0 03/06/2024 10:43 AM     03/06/2024 10:43 AM    CO2 22 03/06/2024 10:43 AM    BUN 15 03/06/2024 10:43 AM    CREATININE 0.76 03/06/2024 10:43 AM    GFRAA 111 11/10/2020 09:54 AM    AGRATIO 1.5 03/06/2024 10:43 AM    AGRATIO 1.1 08/18/2020 11:08 AM    LABGLOM >60 03/06/2024 10:43 AM    GLUCOSE 95 03/06/2024 10:43 AM    PROT 6.7 03/06/2024 10:43 AM    CALCIUM 9.4 03/06/2024 10:43 AM    BILITOT 0.6 03/06/2024 10:43 AM    ALKPHOS 56 03/06/2024 10:43 AM    ALKPHOS 63 08/18/2020 11:08 AM    AST 10 03/06/2024 10:43 AM    ALT 18 03/06/2024 10:43 AM       POC Tests: No results for input(s): \"POCGLU\", \"POCNA\", \"POCK\", \"POCCL\", \"POCBUN\", \"POCHEMO\", \"POCHCT\" in the last 72 hours.    Coags: No results found for: \"PROTIME\", \"INR\", \"APTT\"    HCG (If Applicable): No results found for: \"PREGTESTUR\", \"PREGSERUM\", \"HCG\", \"HCGQUANT\"     ABGs: No results found for: \"PHART\", \"PO2ART\", \"GOG6ALA\", \"RSD1LLC\", \"BEART\", \"C5ENFIWE\"     Type & Screen (If Applicable):  No results found for: \"LABABO\", \"LABRH\"    Drug/Infectious Status (If Applicable):  No results found for: \"HIV\", \"HEPCAB\"    COVID-19 Screening (If Applicable):   Lab Results   Component Value Date/Time    COVID19 Not Detected 08/16/2020 08:45 PM           Anesthesia Evaluation  Patient summary reviewed and Nursing notes reviewed  Airway: Mallampati: III  TM distance: >3 FB   Neck ROM: limited  Mouth opening: > = 3 FB   Dental: normal exam         Pulmonary:normal exam  breath sounds clear to auscultation  (+) pneumonia:                                      Cardiovascular:  Exercise tolerance: good (>4 METS)  (+) hypertension:, CHF:        Rhythm: regular  Rate: normal                    Neuro/Psych:   Negative Neuro/Psych ROS              GI/Hepatic/Renal: Neg GI/Hepatic/Renal ROS            Endo/Other:    (+) hypothyroidism::..                 Abdominal:             Vascular: negative vascular ROS.         Other Findings:

## 2024-04-03 NOTE — PERIOP NOTE
TRANSFER - OUT REPORT:    Verbal report given to Tabitha GOMEZ on Kaylie Ruiz  being transferred to Phase 2  for routine post-op       Report consisted of patient's Situation, Background, Assessment and   Recommendations(SBAR).     Information from the following report(s) Nurse Handoff Report, Adult Overview, Surgery Report, Intake/Output, MAR, and Med Rec Status was reviewed with the receiving nurse.           Lines:   Peripheral IV 04/03/24 Proximal;Right Forearm (Active)   Site Assessment Clean, dry & intact 04/03/24 1137   Line Status Infusing 04/03/24 1137   Phlebitis Assessment No symptoms 04/03/24 1137   Infiltration Assessment 0 04/03/24 1137   Dressing Status Clean, dry & intact 04/03/24 1137   Dressing Type Transparent 04/03/24 1137        Opportunity for questions and clarification was provided.      Patient transported with:  Registered Nurse

## 2024-04-03 NOTE — OP NOTE
Cataract Operative Note      Patient: Kaylie Ruiz               Sex: female          DOA: 4/3/2024         YOB: 1955      Age:  68 y.o.        LOS:  LOS: 0 days     Preoperative Diagnosis: Cataract left eye    Postoperative Diagnosis:  Cataract  left eye  Surgeon: RUKHSANA LOPEZ MD, M.D.  Anesthesia:  Topical anesthesia  Procedure:  Phacoemulsification of posterior chamber for intraocular lens implantation left    Fluids:  0    Procedure in Detail: The operative eye was prepped and draped in the usual fashion.  A lid speculum was placed in the operative eye.  A clear cornea approach was utilized.  A paracentesis incision(s) was constructed with a 1 mm slit knife.  One percent preservative-free lidocaine followed by viscoelastic was instilled into the anterior chamber.  A clear corneal incision was made with a slit knife.  A continuous curvilinear capsulorrhexis was constructed followed by hydrodissection.  A phacoemulsification tip was placed into the eye, and the lens nucleus was emulsified.  The irrigation/aspiration device was then used to remove any remaining cortical material.  Polishing of the capsule was performed as needed.  The intraocular lens was then placed into the capsular bag after it was re-inflated with viscoelastic and dialed to 169 degrees as measured with the Nam gauge.  The remaining viscoelastic was then removed using the irrigation/aspiration device.  BSS on a cannula was then used to hydrate the wound edges.  At the end of the procedure the wound was found to be watertight, the anterior chamber was deep and the pupil round.  No blood loss during surgery.  An antibiotic and anti-inflammatroy was placed into the operative eye.  The lid speculum was removed.  Protective sunglasses were then placed onto the patient.  The patient was taken to the Post Anesthesia Care Unit (PACU) in good condition having tolerated the procedure well.    Estimated Blood Loss: 0

## 2024-04-03 NOTE — DISCHARGE INSTRUCTIONS
Post-Operative Cataract Instructions  [facility]  Nathaniel Arriola M.D.  Ac Hayden M.D.  [address]  [place] [address] [phone]      Diet  Resume normal diet.  Do not drink alcoholic beverages, including beer for 24 hours.    Activity  Do not drive a car or operate any hazardous machinery the day of surgery.  You may resume normal activities as tolerated.  No bending or heavy lifting.  No reading or computer work after your surgery.  You may watch TV.    Wound Care  Anticipate that your eye will tear and water.  You may also experience a sensation of a foreign body, sand, or grit in the surgical eye, this is normal.  Do not touch the affected eye.  ** Do not remove eye shield unless directed to do so by your physician. **  Wear eye shield when resting or sleeping for one week.    Medications  Take Tylenol Extra Strength two (2) tablets by mouth upon arrival home and then every four (4) hours as needed for discomfort.  Regarding Eye drops:  -  Begin using your eye drops as directed by your physician in the (left) operative eye.     One drop of     []   Zymar    []  Vigamox   along with one (1) drop     []  Acular    []  Voltaren   every four (4) hours while awake.     Wait five (5) minutes then apply one (1) drop     []  Pred Forte    []  Econopred Plus   every four (4) hours while awake.          If you take glaucoma medications, continue to do so unless the physician otherwise instructs you.  You may use artificial tears as needed if your eye feels scratchy.    Notify your Physician Immediately for any of the following:  Excessive pain not relieved by Tylenol especially headache or increasing pressure to the operative eye.  Persistent nausea lasting more than eight hours.  If any vomiting occurs.    If any questions or concerns arise, call your Surgeon at [phone].         Sedation: Care Instructions  Overview     Sedation is the use of medicine to

## 2024-04-03 NOTE — INTERVAL H&P NOTE
Update History & Physical    The patient's History and Physical of April 3, 2024 was reviewed with the patient and I examined the patient. There was no change. The surgical site was confirmed by the patient and me.     Plan: The risks, benefits, expected outcome, and alternative to the recommended procedure have been discussed with the patient. Patient understands and wants to proceed with the procedure.     Electronically signed by RUKHSANA LOPEZ MD on 4/3/2024 at 8:50 AM

## 2024-04-03 NOTE — PERIOP NOTE
TRANSFER - IN REPORT:    Verbal report received from JASON Ruiz (name) on Kaylie Ruiz  being received from OR (unit) for routine progression of patient care      Report consisted of patient’s Situation, Background, Assessment and   Recommendations(SBAR).     Information from the following report(s) Nurse Handoff Report, Surgery Report, Intake/Output, and MAR was reviewed with the receiving nurse.    Opportunity for questions and clarification was provided.      Assessment completed upon patient’s arrival to unit and care assume

## 2024-07-02 ENCOUNTER — HOSPITAL ENCOUNTER (OUTPATIENT)
Facility: HOSPITAL | Age: 69
Discharge: HOME | End: 2024-07-04
Attending: INTERNAL MEDICINE
Payer: MEDICARE

## 2024-07-02 VITALS
DIASTOLIC BLOOD PRESSURE: 61 MMHG | HEART RATE: 50 BPM | WEIGHT: 175 LBS | BODY MASS INDEX: 33.04 KG/M2 | HEIGHT: 61 IN | SYSTOLIC BLOOD PRESSURE: 117 MMHG

## 2024-07-02 DIAGNOSIS — R07.9 CHEST PAIN, UNSPECIFIED TYPE: ICD-10-CM

## 2024-07-02 DIAGNOSIS — R94.31 ABNORMAL ELECTROCARDIOGRAM: ICD-10-CM

## 2024-07-02 DIAGNOSIS — R06.09 OTHER FORM OF DYSPNEA: ICD-10-CM

## 2024-07-02 DIAGNOSIS — I50.22 CHRONIC SYSTOLIC CONGESTIVE HEART FAILURE (HCC): ICD-10-CM

## 2024-07-02 PROCEDURE — 93306 TTE W/DOPPLER COMPLETE: CPT

## 2024-07-03 LAB
ECHO AO ASC DIAM: 3.9 CM
ECHO AO ASCENDING AORTA INDEX: 2.19 CM/M2
ECHO AO ROOT DIAM: 3.6 CM
ECHO AO ROOT INDEX: 2.02 CM/M2
ECHO AV AREA PEAK VELOCITY: 1.6 CM2
ECHO AV AREA VTI: 2.2 CM2
ECHO AV AREA/BSA PEAK VELOCITY: 0.9 CM2/M2
ECHO AV AREA/BSA VTI: 1.2 CM2/M2
ECHO AV MEAN GRADIENT: 4 MMHG
ECHO AV MEAN VELOCITY: 0.9 M/S
ECHO AV PEAK GRADIENT: 9 MMHG
ECHO AV PEAK VELOCITY: 1.5 M/S
ECHO AV VELOCITY RATIO: 0.4
ECHO AV VTI: 31.7 CM
ECHO BSA: 1.85 M2
ECHO EST RA PRESSURE: 8 MMHG
ECHO LA DIAMETER INDEX: 2.36 CM/M2
ECHO LA DIAMETER: 4.2 CM
ECHO LA TO AORTIC ROOT RATIO: 1.17
ECHO LA VOL A-L A2C: 104 ML (ref 22–52)
ECHO LA VOL A-L A4C: 91 ML (ref 22–52)
ECHO LA VOL BP: 94 ML (ref 22–52)
ECHO LA VOL MOD A2C: 100 ML (ref 22–52)
ECHO LA VOL MOD A4C: 86 ML (ref 22–52)
ECHO LA VOL/BSA BIPLANE: 53 ML/M2 (ref 16–34)
ECHO LA VOLUME AREA LENGTH: 98 ML
ECHO LA VOLUME INDEX A-L A2C: 58 ML/M2 (ref 16–34)
ECHO LA VOLUME INDEX A-L A4C: 51 ML/M2 (ref 16–34)
ECHO LA VOLUME INDEX AREA LENGTH: 55 ML/M2 (ref 16–34)
ECHO LA VOLUME INDEX MOD A2C: 56 ML/M2 (ref 16–34)
ECHO LA VOLUME INDEX MOD A4C: 48 ML/M2 (ref 16–34)
ECHO LV E' LATERAL VELOCITY: 10 CM/S
ECHO LV E' SEPTAL VELOCITY: 5 CM/S
ECHO LV EDV A2C: 113 ML
ECHO LV EDV A4C: 121 ML
ECHO LV EDV BP: 118 ML (ref 56–104)
ECHO LV EDV INDEX A4C: 68 ML/M2
ECHO LV EDV INDEX BP: 66 ML/M2
ECHO LV EDV NDEX A2C: 63 ML/M2
ECHO LV EJECTION FRACTION A2C: 46 %
ECHO LV EJECTION FRACTION A4C: 51 %
ECHO LV EJECTION FRACTION BIPLANE: 47 % (ref 55–100)
ECHO LV ESV A2C: 61 ML
ECHO LV ESV A4C: 59 ML
ECHO LV ESV BP: 62 ML (ref 19–49)
ECHO LV ESV INDEX A2C: 34 ML/M2
ECHO LV ESV INDEX A4C: 33 ML/M2
ECHO LV ESV INDEX BP: 35 ML/M2
ECHO LV FRACTIONAL SHORTENING: 19 % (ref 28–44)
ECHO LV INTERNAL DIMENSION DIASTOLE INDEX: 2.64 CM/M2
ECHO LV INTERNAL DIMENSION DIASTOLIC: 4.7 CM (ref 3.9–5.3)
ECHO LV INTERNAL DIMENSION SYSTOLIC INDEX: 2.13 CM/M2
ECHO LV INTERNAL DIMENSION SYSTOLIC: 3.8 CM
ECHO LV IVSD: 0.7 CM (ref 0.6–0.9)
ECHO LV MASS 2D: 112.5 G (ref 67–162)
ECHO LV MASS INDEX 2D: 63.2 G/M2 (ref 43–95)
ECHO LV POSTERIOR WALL DIASTOLIC: 0.8 CM (ref 0.6–0.9)
ECHO LV RELATIVE WALL THICKNESS RATIO: 0.34
ECHO LVOT AREA: 4.2 CM2
ECHO LVOT AV VTI INDEX: 0.53
ECHO LVOT DIAM: 2.3 CM
ECHO LVOT MEAN GRADIENT: 1 MMHG
ECHO LVOT PEAK GRADIENT: 1 MMHG
ECHO LVOT PEAK VELOCITY: 0.6 M/S
ECHO LVOT STROKE VOLUME INDEX: 39.2 ML/M2
ECHO LVOT SV: 69.8 ML
ECHO LVOT VTI: 16.8 CM
ECHO MV A VELOCITY: 0.83 M/S
ECHO MV AREA PHT: 2.1 CM2
ECHO MV AREA VTI: 2.2 CM2
ECHO MV E DECELERATION TIME (DT): 191.8 MS
ECHO MV E VELOCITY: 0.6 M/S
ECHO MV E/A RATIO: 0.72
ECHO MV E/E' LATERAL: 6
ECHO MV E/E' RATIO (AVERAGED): 9
ECHO MV E/E' SEPTAL: 12
ECHO MV LVOT VTI INDEX: 1.92
ECHO MV MAX VELOCITY: 0.9 M/S
ECHO MV MEAN GRADIENT: 1 MMHG
ECHO MV MEAN VELOCITY: 0.4 M/S
ECHO MV PEAK GRADIENT: 3 MMHG
ECHO MV PRESSURE HALF TIME (PHT): 103.7 MS
ECHO MV VTI: 32.2 CM
ECHO PV MAX VELOCITY: 0.8 M/S
ECHO PV PEAK GRADIENT: 2 MMHG
ECHO RA END SYSTOLIC VOLUME APICAL 4 CHAMBER INDEX BSA: 24 ML/M2
ECHO RA VOLUME: 43 ML
ECHO RIGHT VENTRICULAR SYSTOLIC PRESSURE (RVSP): 28 MMHG
ECHO RV FREE WALL PEAK S': 13 CM/S
ECHO RV INTERNAL DIMENSION: 3.9 CM
ECHO RV TAPSE: 2.4 CM (ref 1.7–?)
ECHO RVOT MEAN GRADIENT: 1 MMHG
ECHO RVOT PEAK GRADIENT: 3 MMHG
ECHO RVOT PEAK VELOCITY: 0.8 M/S
ECHO RVOT VTI: 16 CM
ECHO TV REGURGITANT MAX VELOCITY: 2.24 M/S
ECHO TV REGURGITANT PEAK GRADIENT: 20 MMHG

## (undated) DEVICE — CANNULA FRM 27GA 7 8IN

## (undated) DEVICE — Device

## (undated) DEVICE — GARMENT,MEDLINE,DVT,INT,CALF,MED, GEN2: Brand: MEDLINE

## (undated) DEVICE — 3M™ TEGADERM™ TRANSPARENT FILM DRESSING FRAME STYLE, 1624W, 2-3/8 IN X 2-3/4 IN (6 CM X 7 CM), 100/CT 4CT/CASE: Brand: 3M™ TEGADERM™

## (undated) DEVICE — TOWEL SURG W16XL26IN BLU NONFENESTRATED DLX ST 2 PER PK

## (undated) DEVICE — PROCEDURE KIT FLUID MGMT 10 FR CUST MAINFOLD

## (undated) DEVICE — PRESSURE MONITORING SET: Brand: TRUWAVE

## (undated) DEVICE — RADIFOCUS GLIDEWIRE: Brand: GLIDEWIRE

## (undated) DEVICE — SOLUTION IRRIGATION H2O 0797305] ICU MEDICAL INC]

## (undated) DEVICE — COVER US PRB W15XL120CM W/ GEL RUBBERBAND TAPE STRP FLD GEN

## (undated) DEVICE — ANGIOGRAPHY KIT CUST VASC

## (undated) DEVICE — SOLUTION IRRIGATION BAL SALT SOLUTION 500 ML STRL BSS

## (undated) DEVICE — CATHETER DIAG AD 5FR L100CM COR GRY HYDRPHLC NYL MPA 2 W/ 2

## (undated) DEVICE — PACK PROCEDURE SURG VASC CATH 161 MMC LF

## (undated) DEVICE — STRAP,POSITIONING,KNEE/BODY,FOAM,4X60": Brand: MEDLINE

## (undated) DEVICE — CLEARCUT® SLIT KNIFE INTREPID MICRO-COAXIAL SYSTEM 2.4 SB: Brand: CLEARCUT®; INTREPID

## (undated) DEVICE — CYTOTOME IRRIG 25GA L16MM ANT CAP BENT

## (undated) DEVICE — SYSTEM FLD MGMT DIA0.9MM 45DEG ULT BAL VISN ACT INTREPID

## (undated) DEVICE — GUIDEWIRE VASC L260CM DIA0035IN TIP L3MM PTFE J STD TAPR FIX

## (undated) DEVICE — SET FLD ADMIN 3 W STPCOCK FIX FEM L BOR 1IN

## (undated) DEVICE — TR BAND RADIAL ARTERY COMPRESSION DEVICE: Brand: TR BAND

## (undated) DEVICE — GLOVE ORANGE PI 7 1/2   MSG9075

## (undated) DEVICE — RADIFOCUS OPTITORQUE ANGIOGRAPHIC CATHETER: Brand: OPTITORQUE